# Patient Record
Sex: FEMALE | Race: WHITE | NOT HISPANIC OR LATINO | Employment: OTHER | URBAN - METROPOLITAN AREA
[De-identification: names, ages, dates, MRNs, and addresses within clinical notes are randomized per-mention and may not be internally consistent; named-entity substitution may affect disease eponyms.]

---

## 2017-01-24 ENCOUNTER — GENERIC CONVERSION - ENCOUNTER (OUTPATIENT)
Dept: OTHER | Facility: OTHER | Age: 62
End: 2017-01-24

## 2017-02-15 ENCOUNTER — GENERIC CONVERSION - ENCOUNTER (OUTPATIENT)
Dept: OTHER | Facility: OTHER | Age: 62
End: 2017-02-15

## 2017-03-16 ENCOUNTER — GENERIC CONVERSION - ENCOUNTER (OUTPATIENT)
Dept: OTHER | Facility: OTHER | Age: 62
End: 2017-03-16

## 2017-03-20 DIAGNOSIS — E78.5 HYPERLIPIDEMIA: ICD-10-CM

## 2017-03-20 DIAGNOSIS — Z13.820 ENCOUNTER FOR SCREENING FOR OSTEOPOROSIS: ICD-10-CM

## 2017-03-20 DIAGNOSIS — Z12.31 ENCOUNTER FOR SCREENING MAMMOGRAM FOR MALIGNANT NEOPLASM OF BREAST: ICD-10-CM

## 2017-04-12 ENCOUNTER — GENERIC CONVERSION - ENCOUNTER (OUTPATIENT)
Dept: OTHER | Facility: OTHER | Age: 62
End: 2017-04-12

## 2017-04-15 ENCOUNTER — LAB CONVERSION - ENCOUNTER (OUTPATIENT)
Dept: OTHER | Facility: OTHER | Age: 62
End: 2017-04-15

## 2017-04-15 LAB
CHOLEST SERPL-MCNC: 215 MG/DL (ref 125–200)
CHOLEST/HDLC SERPL: 3 (CALC)
HDLC SERPL-MCNC: 71 MG/DL
LDL CHOLESTEROL (HISTORICAL): 122 MG/DL (CALC)
NON-HDL-CHOL (CHOL-HDL) (HISTORICAL): 144 MG/DL (CALC)
TRIGL SERPL-MCNC: 108 MG/DL

## 2017-04-18 ENCOUNTER — ALLSCRIPTS OFFICE VISIT (OUTPATIENT)
Dept: OTHER | Facility: OTHER | Age: 62
End: 2017-04-18

## 2017-04-24 ENCOUNTER — HOSPITAL ENCOUNTER (OUTPATIENT)
Dept: BONE DENSITY | Facility: MEDICAL CENTER | Age: 62
Discharge: HOME | End: 2017-04-24
Payer: COMMERCIAL

## 2017-04-24 DIAGNOSIS — Z13.820 ENCOUNTER FOR SCREENING FOR OSTEOPOROSIS: ICD-10-CM

## 2017-04-24 PROCEDURE — 77080 DXA BONE DENSITY AXIAL: CPT

## 2017-05-01 ENCOUNTER — GENERIC CONVERSION - ENCOUNTER (OUTPATIENT)
Dept: OTHER | Facility: OTHER | Age: 62
End: 2017-05-01

## 2017-05-10 ENCOUNTER — ALLSCRIPTS OFFICE VISIT (OUTPATIENT)
Dept: OTHER | Facility: OTHER | Age: 62
End: 2017-05-10

## 2017-05-31 ENCOUNTER — LAB CONVERSION - ENCOUNTER (OUTPATIENT)
Dept: OTHER | Facility: OTHER | Age: 62
End: 2017-05-31

## 2017-05-31 LAB
CA 125 (HISTORICAL): 11 U/ML
CA 125 (HISTORICAL): 11 U/ML

## 2017-06-01 ENCOUNTER — GENERIC CONVERSION - ENCOUNTER (OUTPATIENT)
Dept: OTHER | Facility: OTHER | Age: 62
End: 2017-06-01

## 2017-06-13 ENCOUNTER — GENERIC CONVERSION - ENCOUNTER (OUTPATIENT)
Dept: OTHER | Facility: OTHER | Age: 62
End: 2017-06-13

## 2017-08-15 DIAGNOSIS — R09.89 OTHER SPECIFIED SYMPTOMS AND SIGNS INVOLVING THE CIRCULATORY AND RESPIRATORY SYSTEMS: ICD-10-CM

## 2017-08-16 ENCOUNTER — GENERIC CONVERSION - ENCOUNTER (OUTPATIENT)
Dept: OTHER | Facility: OTHER | Age: 62
End: 2017-08-16

## 2017-08-18 ENCOUNTER — GENERIC CONVERSION - ENCOUNTER (OUTPATIENT)
Dept: OTHER | Facility: OTHER | Age: 62
End: 2017-08-18

## 2017-08-25 ENCOUNTER — GENERIC CONVERSION - ENCOUNTER (OUTPATIENT)
Dept: OTHER | Facility: OTHER | Age: 62
End: 2017-08-25

## 2017-09-11 ENCOUNTER — TRANSCRIBE ORDERS (OUTPATIENT)
Dept: ADMINISTRATIVE | Facility: HOSPITAL | Age: 62
End: 2017-09-11

## 2017-09-11 DIAGNOSIS — E78.5 HYPERLIPIDEMIA, UNSPECIFIED HYPERLIPIDEMIA TYPE: Primary | ICD-10-CM

## 2017-09-25 ENCOUNTER — HOSPITAL ENCOUNTER (OUTPATIENT)
Dept: ULTRASOUND IMAGING | Facility: MEDICAL CENTER | Age: 62
Discharge: HOME | End: 2017-09-25
Payer: COMMERCIAL

## 2017-09-25 DIAGNOSIS — E78.5 HYPERLIPIDEMIA, UNSPECIFIED HYPERLIPIDEMIA TYPE: ICD-10-CM

## 2017-09-25 PROCEDURE — 93880 EXTRACRANIAL BILAT STUDY: CPT

## 2017-09-26 ENCOUNTER — GENERIC CONVERSION - ENCOUNTER (OUTPATIENT)
Dept: OTHER | Facility: OTHER | Age: 62
End: 2017-09-26

## 2018-01-10 NOTE — RESULT NOTES
Discussion/Summary   Carotid ultrasound was reviewed,  showed less than 50 percent blockage on both sides, it is similar to the previous study in 2015 with no changes  Verified Results  VAS CAROTID COMPLETE STUDY [id number] 04:12PM Rozashu Reef     Test Name Result Flag Reference   VAS CAROTID COMPLETE STUDY (Report)     [facility] REPORT   CLINICAL:   Indications: Carotid disease w/o CVA [I65 29]  Patient for a follow-up  Asymptomatic  Risk Factors   The patient has history of hyperlipidemia  She has no history of HTN or   Diabetes  Clinical   Right Pressure: 106/72 mm Hg, Left Pressure: 110/70 mm Hg  FINDINGS:      Right    Impression PSV EDV (cm/s) Direction of Flow Ratio    Dist  ICA         55     23           0 65    Mid  ICA         92     32           1 10    Prox  ICA  1 - 49%   66     24           0 79    Dist CCA         71     22                 Mid CCA          84     26           0 98    Prox CCA         85     26                 Ext Carotid        98     16           1 17    Prox Vert         60     19 Antegrade            Subclavian        95      0                    Left     Impression PSV EDV (cm/s) Direction of Flow Ratio    Dist  ICA         81     31           0 92    Mid  ICA         80     35           0 91    Prox  ICA  1 - 49%   64     16           0 72    Dist CCA         84     26                 Mid CCA          88     25           1 12    Prox CCA         79     17                 Ext Carotid       103     18           1 17    Prox Vert         44     15 Antegrade            Subclavian        124      0                          CONCLUSION:      Impression   RIGHT:   There is <50% stenosis noted in the internal carotid artery  Plaque is   heterogenous and smooth  Vertebral artery flow is antegrade  There is no significant subclavian artery   disease  LEFT:   There is <50% stenosis noted in the internal carotid artery  Plaque is   homogenous and smooth  Vertebral artery flow is antegrade  There is no significant subclavian artery   disease  Compared to previous study on 1/5/15 , there is no significant change in the   disease process  Internal carotid artery stenosis determination by consensus criteria from:   Latonia Salguero et al  Carotid Artery Stenosis: Gray-Scale and Doppler US Diagnosis   - Society of [employer]iologists in [address] [place] [address], Radiology 2003;   [phone]        SIGNATURE:   Electronically Signed by: Stephen Stern on 2017-09-25 05:41:10 PM

## 2018-01-10 NOTE — MISCELLANEOUS
Message   Recorded as Task   Date: 05/31/2017 04:32 PM, Created By: Ahmet Witt   Task Name: Result Follow Up   Assigned To: [facility],Team   Regarding Patient: Blas Cantrell, Status: In Progress   NoahMateo Few - 31 May 2017 4:32 PM     TASK CREATED  Normal   Please notify patient     Thanks   Aelx Landeros - 01 Jun 2017 7:26 AM     TASK IN PROGRESS   Kesha Holliday - 01 Jun 2017 7:28 AM     TASK EDITED  LMOM of normal   pt to call back with any questions or problems        Active Problems    1  Abdominal wall bulge (789 30) (R19 00)   2  Abnormal pelvic exam (793 5) (Z01 411)   3  Anxiety disorder (300 00) (F41 9)   4  Atrophy of vagina (627 3) (N95 2)   5  Carotid bruit (785 9) (R09 89)   6  GERD (gastroesophageal reflux disease) (530 81) (K21 9)   7  Hot flashes, menopausal (627 2) (N95 1)   8  Hyperlipidemia (272 4) (E78 5)   9  Insomnia (780 52) (G47 00)   10  Menopausal state (627 2) (N95 1)   11  Right hip pain (719 45) (M25 551)   12  Vaginal wall prolapse (618 00) (N81 10)    Current Meds   1  Atorvastatin Calcium 10 MG Oral Tablet; Take 1 tablet daily; Therapy: 14Wma4866 to (Evaluate:06Nov2017)  Requested for: 26ICK7912; Last   Rx:40Blg6132 Ordered   2  DrRx Flexeril 10 MG #30; 1 TAB TID PRN; Therapy: 71VZA0892 to (Last Rx:10May2017) Ordered   3  Garlic CAPS; TAKE 1 CAPSULE Daily Recorded   4  LORazepam 0 5 MG Oral Tablet; Take 1 tablet once daily; Therapy: 00TDY1440 to (Evaluate:82Uim7107)  Requested for: 15Rfi8968; Last   Rx:80Wvc4579 Ordered   5  Omega 3 1000 MG Oral Capsule; TAKE 1 CAPSULE DAILY; Therapy: (Recorded:26Mar2015) to Recorded   6  Omeprazole 20 MG Oral Capsule Delayed Release; Therapy: 76UOX9417 to Recorded   7  Zolpidem Tartrate 10 MG Oral Tablet; TAKE 1 TABLET NIGHTLY; Therapy: 76Ayz7681 to (Evaluate:24Apr2017)  Requested for: 67DIR3219; Last   Rx:25Jan2017 Ordered    Allergies    1   No Known Drug Allergies    Signatures   Electronically signed by : Breonna Calvert, ; Jun 1 2017  7:29AM EST                       (Author)

## 2018-01-11 NOTE — MISCELLANEOUS
Message   Recorded as Task   Date: 08/14/2017 12:14 PM, Created By: Lev Gandhi   Task Name: Follow Up   Assigned To: [facility] Team   Regarding Patient: KASHMIR CAMPOVERDE, Status: In Progress   Chinmay De Oliveira - 14 Aug 2017 12:14 PM     TASK CREATED  Caller: Self; General Medical Question; [phone] (Home); [phone] (Work)  Patient wants to have a routine carotid U/S and stress test script written up for her by Dr Lissa Stallworth  She states that she gets one done usually every 3-4 years  She doesn't see a cardiologist or anything either  Please advise  Naheed Garcia - 14 Aug 2017 4:33 PM     TASK REASSIGNED: Previously Assigned To [facility] [place] - 15 Aug 2017 10:08 AM     TASK EDITED  I did review pt chart   she had stress test and carotid US in 2013 that was ordered by Janel Montgomery     Carotid US will be ordered again since pt had h/o carotid bruit and we need to fu on that from time to time     Stress test was ordered back then because pt had sx of chest pain, no need for repeat stress test unless pt has cardiac symptoms  order for carotid US is in  Teri Li - 16 Aug 2017 11:39 AM     TASK IN PROGRESS   Teri Li - 16 Aug 2017 11:47 AM     TASK EDITED  pt notified of DM response  order for carotid ultrasound mailed to pt as requested  Active Problems    1  Abdominal wall bulge (789 30) (R19 00)   2  Abnormal pelvic exam (793 5) (Z01 411)   3  Anxiety disorder (300 00) (F41 9)   4  Atrophy of vagina (627 3) (N95 2)   5  Carotid bruit (785 9) (R09 89)   6  GERD (gastroesophageal reflux disease) (530 81) (K21 9)   7  Hot flashes, menopausal (627 2) (N95 1)   8  Hyperlipidemia (272 4) (E78 5)   9  Insomnia (780 52) (G47 00)   10  Menopausal state (627 2) (N95 1)   11  Right hip pain (719 45) (M25 551)   12  Vaginal wall prolapse (618 00) (N81 10)    Current Meds   1  Atorvastatin Calcium 10 MG Oral Tablet;  Take 1 tablet daily; Therapy: 64Cop4274 to (Evaluate:06Nov2017)  Requested for: 33VHC9346; Last   Rx:10May2017 Ordered   2  DrRx Flexeril 10 MG #30; 1 TAB TID PRN; Therapy: 68GHU2926 to (Last Rx:10May2017) Ordered   3  Garlic CAPS; TAKE 1 CAPSULE Daily Recorded   4  LORazepam 0 5 MG Oral Tablet; Take 1 tablet once daily; Therapy: 75MAW0310 to (Cici Cain)  Requested for: 69NUO7031; Last   Rx:22Jun2017 Ordered   5  Omega 3 1000 MG Oral Capsule; TAKE 1 CAPSULE DAILY; Therapy: (Recorded:26Mar2015) to Recorded   6  Omeprazole 20 MG Oral Capsule Delayed Release; Therapy: 26DGK6046 to Recorded   7  Zolpidem Tartrate 10 MG Oral Tablet; TAKE 1 TABLET NIGHTLY; Therapy: 63Zfr4485 to (Evaluate:24Apr2017)  Requested for: 29GLK7220; Last   Rx:25Jan2017 Ordered    Allergies    1   No Known Drug Allergies    Signatures   Electronically signed by : Lalito Duran, ; Aug 16 2017 11:48AM EST                       (Author)

## 2018-01-11 NOTE — MISCELLANEOUS
Message   Recorded as Task   Date: 01/23/2017 10:03 AM, Created By: Anel Joshi   Task Name: Med Renewal Request   Assigned To: Hannah Renee   Regarding Patient: Karishma Johns, Status: In Progress   Comment:    WilSvitlana - 23 Jan 2017 10:03 AM     TASK CREATED  Caller: Self; Renew Medication; [phone] (Home)  Pt called to request refill of Zolpidem called in to [facility]  Pt requested a call once the rx is sent to them  Pt is @ [phone] ok to LM per pt   Phong Chu - 23 Jan 2017 10:39 AM     TASK REASSIGNED: Previously Assigned To Sunday Rubio - 23 Jan 2017 10:48 AM     TASK EDITED  Pt uses ambien 10 mg, has been getting 45 for 3 mo and 1 refill  [facility]  may I refill it? Thanks   Omar Conway - 24 Jan 2017 2:32 PM     TASK REPLIED TO: Previously Assigned To Omar Conway  You certainly may refill Ambien 10 mg mg once daily x 90 days  tx  JC   Lana Fisher - 24 Jan 2017 2:48 PM     TASK IN PROGRESS   Lana Fisher - 24 Jan 2017 2:49 PM     TASK EDITED  rx for ambien 10mg [facility] at [phone]   Bravo Lulu - 24 Jan 2017 2:52 PM     TASK EDITED  lm for pt that rx faxed        Active Problems    1  Abnormal tympanic membrane (384 9) (H73 90)   2  Acid reflux disease (530 81) (K21 9)   3  Anxiety disorder (300 00) (F41 9)   4  Atrophy of vagina (627 3) (N95 2)   5  Carotid bruit (785 9) (R09 89)   6  Dysphagia (787 20) (R13 10)   7  Earache, right (388 70) (H92 01)   8  Hot flashes, menopausal (627 2) (N95 1)   9  Hyperlipidemia (272 4) (E78 5)   10  Insomnia (780 52) (G47 00)   11  Menopausal state (627 2) (N95 1)   12  Screening for ovarian cancer (V76 46) (Z12 73)   13  Sinusitis (473 9) (J32 9)   14  Vaginal wall prolapse (618 00) (N81 10)    Current Meds   1  Atorvastatin Calcium 10 MG Oral Tablet; Take 1 tablet daily; Therapy: 31Yzx2834 to (Evaluate:98Mga7742)  Requested for: 64FYF0017; Last   Rx:05Nlq9150 Ordered   2  Garlic CAPS; TAKE 1 CAPSULE Daily Recorded   3  LORazepam 0 5 MG Oral Tablet; Take 1 tablet once daily; Therapy: 58AOR9175 to (Helen Garcia)  Requested for: 96ETM6875; Last   Rx:46Yab8410 Ordered   4  Omega 3 1000 MG Oral Capsule; TAKE 1 CAPSULE DAILY; Therapy: (Recorded:26Mar2015) to Recorded   5  Sulfamethoxazole-Trimethoprim 800-160 MG Oral Tablet; Take 1 tablet twice daily; Therapy: 69JPC6327 to (Last Rx:87Mgk5178)  Requested for: 06LCJ5660 Ordered   6  Zolpidem Tartrate 10 MG Oral Tablet; TABS PO X 60; Therapy: 60Tvt4280 to (Evaluate:25Hel0815)  Requested for: 32Plf9658 Recorded    Allergies    1   No Known Drug Allergies    Plan  Anxiety disorder    · From  Zolpidem Tartrate 10 MG Oral Tablet TABS PO X 60 To Zolpidem  Tartrate 10 MG Oral Tablet TAKE 1 TABLET NIGHTLY    Signatures   Electronically signed by : Raya Paulson, ; Jan 24 2017  2:52PM EST                       (Author)

## 2018-01-12 NOTE — MISCELLANEOUS
Message   Recorded as Task   Date: 01/23/2017 10:03 AM, Created By: Darcy Mayorga   Task Name: Med Renewal Request   Assigned To: Heather Corey   Regarding Patient: Myesha Ontiveros, Status: In Progress   Comment:    Svitlana De La Torre - 23 Jan 2017 10:03 AM     TASK CREATED  Caller: Self; Renew Medication; [phone] (Home)  Pt called to request refill of Zolpidem called in to [facility]  Pt requested a call once the rx is sent to them  Pt is @ [phone] ok to LM per pt   John Paul Solomon - 23 Jan 2017 10:39 AM     TASK REASSIGNED: Previously Assigned To Laureano Ibrahim - 23 Jan 2017 10:48 AM     TASK EDITED  Pt uses ambien 10 mg, has been getting 45 for 3 mo and 1 refill  [facility]  may I refill it? Thanks   Omar Conway - 24 Jan 2017 2:32 PM     TASK REPLIED TO: Previously Assigned To Omar Conway  You certainly may refill Ambien 10 mg mg once daily x 90 days  tx  [facility]   Lana Fisher - 24 Jan 2017 2:48 PM     TASK IN PROGRESS   Lana Fisher - 24 Jan 2017 2:49 PM     TASK EDITED  rx [facility] ambien [facility]mg [facility] [phone]        Active Problems    1  Abnormal tympanic membrane (384 9) (H73 90)   2  Acid reflux disease (530 81) (K21 9)   3  Anxiety disorder (300 00) (F41 9)   4  Atrophy of vagina (627 3) (N95 2)   5  Carotid bruit (785 9) (R09 89)   6  Dysphagia (787 20) (R13 10)   7  Earache, right (388 70) (H92 01)   8  Hot flashes, menopausal (627 2) (N95 1)   9  Hyperlipidemia (272 4) (E78 5)   10  Insomnia (780 52) (G47 00)   11  Menopausal state (627 2) (N95 1)   12  Screening for ovarian cancer (V76 46) (Z12 73)   13  Sinusitis (473 9) (J32 9)   14  Vaginal wall prolapse (618 00) (N81 10)    Current Meds   1  Atorvastatin Calcium 10 MG Oral Tablet; Take 1 tablet daily; Therapy: 57Uvq3849 to (Evaluate:19Rcn1757)  Requested for: 80SAW3779; Last   Rx:60Ojy0221 Ordered   2  Garlic CAPS; TAKE 1 CAPSULE Daily Recorded   3   LORazepam 0 5 MG Oral Tablet; Take 1 tablet once daily; Therapy: 96PGP1447 to (Tio Collann-marie)  Requested for: 53MIH3681; Last   Rx:35Eux7868 Ordered   4  Omega 3 1000 MG Oral Capsule; TAKE 1 CAPSULE DAILY; Therapy: (Recorded:26Mar2015) to Recorded   5  Sulfamethoxazole-Trimethoprim 800-160 MG Oral Tablet; Take 1 tablet twice daily; Therapy: 20XIU0640 to (Last Rx:00Fpr5338)  Requested for: 22AXW7696 Ordered   6  Zolpidem Tartrate 10 MG Oral Tablet; TABS PO X 60; Therapy: 24Sds0513 to (Evaluate:88Rsu4989)  Requested for: 76Nfi4644 Recorded    Allergies    1   No Known Drug Allergies    Plan  Anxiety disorder    · From  Zolpidem Tartrate 10 MG Oral Tablet TABS PO X 60 To Zolpidem  Tartrate 10 MG Oral Tablet TAKE 1 TABLET NIGHTLY    Signatures   Electronically signed by : Clare Chaparro, ; Jan 24 2017  2:50PM EST                       (Author)

## 2018-01-13 NOTE — RESULT NOTES
Message   blood work looks great    her cholesterol numbers improved in comapre to the last time to cont with her current regimen and lipitor  Verified Results  (1) LIPID PANEL, FASTING 01Sep2016 10:01AM Balta See     Test Name Result Flag Reference   CHOLESTEROL, TOTAL 212 mg/dL H 125-200   HDL CHOLESTEROL 74 mg/dL  > OR = 46   TRIGLICERIDES 413 mg/dL  <150   LDL-CHOLESTEROL 116 mg/dL (calc)  <130   Desirable range <100 mg/dL for patients with CHD or  diabetes and <70 mg/dL for diabetic patients with  known heart disease  CHOL/HDLC RATIO 2 9 (calc)  < OR = 5 0   NON HDL CHOLESTEROL 138 mg/dL (calc)     Target for non-HDL cholesterol is 30 mg/dL higher than   LDL cholesterol target

## 2018-01-13 NOTE — MISCELLANEOUS
Message   Recorded as Task   Date: 12/16/2016 02:05 PM, Created By: iKesha Lomas   Task Name: Follow Up   Assigned To: Lennox Sarah   Regarding Patient: Sunday Herrera, Status: In Progress   Comment:    Darcy Russ - 16 Dec 2016 2:05 PM     TASK CREATED  Caller: Self; [phone] (Mobile Phone)  pt called in reguards to u/s results please advise pt @ [phone]   Lana Fisher - 16 Dec 2016 2:26 PM     TASK IN PROGRESS   Lana Fisher - 16 Dec 2016 2:28 PM     TASK EDITED  lm for pt that  u/s wnl   Lana Fisher - 16 Dec 2016 3:30 PM     TASK EDITED  returned pts call, lm for pt tcb Socorro Zurita - 01 Dec 2016 8:15 AM     TASK EDITED  Pt was concerned about vag prolapse  She will wait condition out        Active Problems    1  Abnormal tympanic membrane (384 9) (H73 90)   2  Acid reflux disease (530 81) (K21 9)   3  Anxiety disorder (300 00) (F41 9)   4  Atrophy of vagina (627 3) (N95 2)   5  Carotid bruit (785 9) (R09 89)   6  Dysphagia (787 20) (R13 10)   7  Earache, right (388 70) (H92 01)   8  Hot flashes, menopausal (627 2) (N95 1)   9  Hyperlipidemia (272 4) (E78 5)   10  Insomnia (780 52) (G47 00)   11  Menopausal state (627 2) (N95 1)   12  Screening for ovarian cancer (V76 46) (Z12 73)   13  Sinusitis (473 9) (J32 9)   14  Vaginal wall prolapse (618 00) (N81 10)    Current Meds   1  Atorvastatin Calcium 10 MG Oral Tablet; Take 1 tablet daily; Therapy: 51Kev2135 to (Evaluate:02Rte8977)  Requested for: 53YWT9931; Last   Rx:22Cyr1632 Ordered   2  Garlic CAPS; TAKE 1 CAPSULE Daily Recorded   3  LORazepam 0 5 MG Oral Tablet; Take 1 tablet once daily; Therapy: 87MCD4311 to (Lajoyce Marine)  Requested for: 43UXY6852; Last   Rx:99Grk2350 Ordered   4  Omega 3 1000 MG Oral Capsule; TAKE 1 CAPSULE DAILY; Therapy: (Recorded:26Mar2015) to Recorded   5  Sulfamethoxazole-Trimethoprim 800-160 MG Oral Tablet; Take 1 tablet twice daily;    Therapy: 54QAS8979 to (Last Rx:95Pyc3690) Requested for: 34RRH3805 Ordered   6  Zolpidem Tartrate 10 MG Oral Tablet; TABS PO X 60; Therapy: 23Bpr9048 to (Evaluate:67Cvd9666)  Requested for: 98Map7781 Recorded    Allergies    1  No Known Drug Allergies    Signatures   Electronically signed by :  January Chavez, ; Dec 19 2016  8:15AM EST                       (Author)

## 2018-01-14 VITALS
SYSTOLIC BLOOD PRESSURE: 118 MMHG | HEART RATE: 64 BPM | DIASTOLIC BLOOD PRESSURE: 70 MMHG | WEIGHT: 157 LBS | HEIGHT: 67 IN | BODY MASS INDEX: 24.64 KG/M2

## 2018-01-14 VITALS
DIASTOLIC BLOOD PRESSURE: 74 MMHG | SYSTOLIC BLOOD PRESSURE: 118 MMHG | BODY MASS INDEX: 20.86 KG/M2 | WEIGHT: 154 LBS | HEIGHT: 72 IN

## 2018-01-16 NOTE — MISCELLANEOUS
Message   Recorded as Task   Date: 04/30/2017 07:23 AM, Created By: Rafael Bond   Task Name: Care Coordination   Assigned To: Lennox Sarah   Regarding Patient: Sunday Herrera, Status: In Progress   Comment:    Omar Conway - 30 Apr 2017 7:23 AM     TASK CREATED  Pt should be aware that her BMD is normal to better than normal!  Dietary calcium and multiple vitamin is still recommended  Becki Rider - 01 May 2017 7:53 AM     TASK IN PROGRESS   Becki Rider - 01 May 2017 7:57 AM     TASK EDITED  LMOM to cb    Becki Rider - 01 May 2017 8:32 AM     TASK EDITED  Pt returned call - advised her of DEXA results and M87 recommendations  Active Problems    1  Abnormal pelvic exam (793 5) (Z01 411)   2  Abnormal tympanic membrane (384 9) (H73 90)   3  Acid reflux disease (530 81) (K21 9)   4  Anxiety disorder (300 00) (F41 9)   5  Atrophy of vagina (627 3) (N95 2)   6  Carotid bruit (785 9) (R09 89)   7  Dysphagia (787 20) (R13 10)   8  Earache, right (388 70) (H92 01)   9  Encounter for screening mammogram for malignant neoplasm of breast (V76 12)   (Z12 31)   10  Hot flashes, menopausal (627 2) (N95 1)   11  Hyperlipidemia (272 4) (E78 5)   12  Insomnia (780 52) (G47 00)   13  Menopausal state (627 2) (N95 1)   14  Osteoporosis screening (V82 81) (Z13 820)   15  Screening for ovarian cancer (V76 46) (Z12 73)   16  Sinusitis (473 9) (J32 9)   17  Vaginal wall prolapse (618 00) (N81 10)    Current Meds   1  Atorvastatin Calcium 10 MG Oral Tablet; Take 1 tablet daily; Therapy: 52Csv0688 to (Evaluate:72Ztb6708)  Requested for: 96DMY3399; Last   Rx:66Kif3597 Ordered   2  Garlic CAPS; TAKE 1 CAPSULE Daily Recorded   3  LORazepam 0 5 MG Oral Tablet; Take 1 tablet once daily; Therapy: 94XEY6960 to (Evaluate:54Low2934)  Requested for: 17Egp2889; Last   Rx:64Xxi5197 Ordered   4  Omega 3 1000 MG Oral Capsule; TAKE 1 CAPSULE DAILY; Therapy: (Recorded:26Mar2015) to Recorded   5   Zolpidem Tartrate 10 MG Oral Tablet; TAKE 1 TABLET NIGHTLY; Therapy: 99Lyg3828 to (Evaluate:24Apr2017)  Requested for: 36GLM7864; Last   Rx:25Jan2017 Ordered    Allergies    1   No Known Drug Allergies    Signatures   Electronically signed by : oZhreh Sigala, ; May  1 2017  8:33AM EST                       (Author)

## 2018-01-17 NOTE — MISCELLANEOUS
Message   Recorded as Task   Date: 08/18/2017 11:03 AM, Created By: Kali Ott   Task Name: Med Renewal Request   Assigned To: Eamon Zarate   Regarding Patient: Patricia Cadet, Status: In Progress   Comment:    Ruby Botello - 18 Aug 2017 11:03 AM     TASK CREATED  Pt called office today, left voicemail message  Pt requests Rx refill of Zolpidem Tartrate 10 mg tablet as prescribed by Dr Ayleen Bowles on 1/25/17  Pt saw Dr Ayleen Bowles on 4/18/17, is scheduled to see Donita Coats on 8/25/17  Pt requests nursing staff return her call @ [phone]  Thank you! Becki Rider - 18 Aug 2017 11:07 AM     TASK IN PROGRESS   Becki Rider - 18 Aug 2017 11:08 AM     TASK EDITED  Ok to refill? Please advise  Thanks! Omar Conway - 18 Aug 2017 12:23 PM     TASK EDITED  Naya Ege to refill  as written  Junita Aland - 21 Aug 2017 12:23 PM     TASK EDITED  OK to refill as written  Evaline  - 18 Aug 2017 1:05 PM     TASK EDITED   Carla Stalling - 18 Aug 2017 1:08 PM     TASK IN PROGRESS   Lorene Low - 18 Aug 2017 1:25 PM     TASK EDITED  RX was ordered and sent to Dr Ayleen Bowles for approval   Patient was notified rx to be sent to her mailorder per her request         Active Problems    1  Abdominal wall bulge (789 30) (R19 00)   2  Abnormal pelvic exam (793 5) (Z01 411)   3  Anxiety disorder (300 00) (F41 9)   4  Atrophy of vagina (627 3) (N95 2)   5  Carotid bruit (785 9) (R09 89)   6  GERD (gastroesophageal reflux disease) (530 81) (K21 9)   7  Hot flashes, menopausal (627 2) (N95 1)   8  Hyperlipidemia (272 4) (E78 5)   9  Insomnia (780 52) (G47 00)   10  Menopausal state (627 2) (N95 1)   11  Right hip pain (719 45) (M25 551)   12  Vaginal wall prolapse (618 00) (N81 10)    Current Meds   1  Atorvastatin Calcium 10 MG Oral Tablet; Take 1 tablet daily; Therapy: 79Zmb2044 to (Evaluate:06Nov2017)  Requested for: 27EPC7251; Last   Rx:47Cxp1330 Ordered   2   DrRx Flexeril 10 MG #30; 1 TAB TID PRN; Therapy: 95LBT9480 to (Last Rx:56Sxt4350) Ordered   3  Garlic CAPS; TAKE 1 CAPSULE Daily Recorded   4  LORazepam 0 5 MG Oral Tablet; Take 1 tablet once daily; Therapy: 64TLB4374 to (Bright Feldman)  Requested for: 11FCC7881; Last   Rx:22Jun2017 Ordered   5  Omega 3 1000 MG Oral Capsule; TAKE 1 CAPSULE DAILY; Therapy: (Recorded:26Mar2015) to Recorded   6  Omeprazole 20 MG Oral Capsule Delayed Release; Therapy: 02ENB7244 to Recorded   7  Zolpidem Tartrate 10 MG Oral Tablet; TAKE 1 TABLET NIGHTLY; Therapy: 70Mlw0754 to (Evaluate:24Apr2017)  Requested for: 04AIV9452; Last   Rx:25Jan2017 Ordered    Allergies    1   No Known Drug Allergies    Plan  Anxiety disorder    · Zolpidem Tartrate 10 MG Oral Tablet; TAKE 1 TABLET NIGHTLY    Signatures   Electronically signed by : Marita Paulino, ; Aug 18 2017  1:25PM EST                       (Author)

## 2018-01-17 NOTE — MISCELLANEOUS
Message   Recorded as Task   Date: 12/16/2016 02:05 PM, Created By: Tommy Chua   Task Name: Follow Up   Assigned To: David Calvillo   Regarding Patient: More Portal, Status: In Progress   Comment:    Darcy Russ - 16 Dec 2016 2:05 PM     TASK CREATED  Caller: Self; [phone] (Mobile Phone)  pt called in [facility] to u/s results please advise pt @ [phone]   Lana Fisher - 16 Dec 2016 2:26 PM     TASK IN PROGRESS   Lana Fisher - 16 Dec 2016 2:28 PM     TASK EDITED  lm for pt that  u/s wnl        Active Problems    1  Abnormal tympanic membrane (384 9) (H73 90)   2  Acid reflux disease (530 81) (K21 9)   3  Anxiety disorder (300 00) (F41 9)   4  Atrophy of vagina (627 3) (N95 2)   5  Carotid bruit (785 9) (R09 89)   6  Dysphagia (787 20) (R13 10)   7  Earache, right (388 70) (H92 01)   8  Hot flashes, menopausal (627 2) (N95 1)   9  Hyperlipidemia (272 4) (E78 5)   10  Insomnia (780 52) (G47 00)   11  Menopausal state (627 2) (N95 1)   12  Screening for ovarian cancer (V76 46) (Z12 73)   13  Sinusitis (473 9) (J32 9)   14  Vaginal wall prolapse (618 00) (N81 10)    Current Meds   1  Atorvastatin Calcium 10 MG Oral Tablet; Take 1 tablet daily; Therapy: 19Lzj0418 to (Evaluate:31Swj9271)  Requested for: 86OGI0494; Last   Rx:83Qlq7099 Ordered   2  Garlic CAPS; TAKE 1 CAPSULE Daily Recorded   3  LORazepam 0 5 MG Oral Tablet; Take 1 tablet once daily; Therapy: 46PUY8964 to (Baldo Payan)  Requested for: 78CXE7232; Last   Rx:73Ajk9930 Ordered   4  Omega 3 1000 MG Oral Capsule; TAKE 1 CAPSULE DAILY; Therapy: (Recorded:26Mar2015) to Recorded   5  Sulfamethoxazole-Trimethoprim 800-160 MG Oral Tablet; Take 1 tablet twice daily; Therapy: 91DNE3530 to (Last Rx:49Itu4925)  Requested for: 97VOG8546 Ordered   6  Zolpidem Tartrate 10 MG Oral Tablet; TABS PO X 60; Therapy: 41Vfo0505 to (Evaluate:35Jki9004)  Requested for: 24Ewv2384 Recorded    Allergies    1   No Known Drug Allergies    Signatures   Electronically signed by : Aidan Hilliard, ; Dec 16 2016  2:28PM EST                       (Author)

## 2018-01-22 VITALS
DIASTOLIC BLOOD PRESSURE: 82 MMHG | HEIGHT: 67 IN | SYSTOLIC BLOOD PRESSURE: 126 MMHG | WEIGHT: 156 LBS | BODY MASS INDEX: 24.48 KG/M2

## 2018-01-31 ENCOUNTER — OFFICE VISIT (OUTPATIENT)
Dept: FAMILY MEDICINE CLINIC | Facility: CLINIC | Age: 63
End: 2018-01-31
Payer: COMMERCIAL

## 2018-01-31 VITALS
RESPIRATION RATE: 16 BRPM | BODY MASS INDEX: 23.76 KG/M2 | HEIGHT: 68 IN | WEIGHT: 156.8 LBS | SYSTOLIC BLOOD PRESSURE: 130 MMHG | HEART RATE: 80 BPM | DIASTOLIC BLOOD PRESSURE: 68 MMHG

## 2018-01-31 DIAGNOSIS — H69.83 DYSFUNCTION OF BOTH EUSTACHIAN TUBES: ICD-10-CM

## 2018-01-31 DIAGNOSIS — M79.641 RIGHT HAND PAIN: ICD-10-CM

## 2018-01-31 DIAGNOSIS — K21.9 GASTROESOPHAGEAL REFLUX DISEASE WITHOUT ESOPHAGITIS: Primary | ICD-10-CM

## 2018-01-31 DIAGNOSIS — E78.2 MIXED HYPERLIPIDEMIA: ICD-10-CM

## 2018-01-31 DIAGNOSIS — F41.1 GENERALIZED ANXIETY DISORDER: ICD-10-CM

## 2018-01-31 DIAGNOSIS — G47.00 INSOMNIA, UNSPECIFIED TYPE: ICD-10-CM

## 2018-01-31 DIAGNOSIS — R09.89 CAROTID BRUIT, UNSPECIFIED LATERALITY: ICD-10-CM

## 2018-01-31 PROCEDURE — 99214 OFFICE O/P EST MOD 30 MIN: CPT | Performed by: FAMILY MEDICINE

## 2018-01-31 PROCEDURE — 1101F PT FALLS ASSESS-DOCD LE1/YR: CPT | Performed by: FAMILY MEDICINE

## 2018-01-31 RX ORDER — ATORVASTATIN CALCIUM 10 MG/1
10 TABLET, FILM COATED ORAL DAILY
Qty: 90 TABLET | Refills: 3 | Status: SHIPPED | OUTPATIENT
Start: 2018-01-31 | End: 2018-05-01 | Stop reason: SDUPTHER

## 2018-01-31 RX ORDER — CHLORAL HYDRATE 500 MG
1 CAPSULE ORAL DAILY
COMMUNITY

## 2018-01-31 RX ORDER — ZOLPIDEM TARTRATE 10 MG/1
10 TABLET ORAL DAILY
COMMUNITY
Start: 2010-12-27 | End: 2018-05-03 | Stop reason: SDUPTHER

## 2018-01-31 RX ORDER — NICOTINE POLACRILEX 4 MG/1
GUM, CHEWING ORAL DAILY
COMMUNITY
Start: 2017-05-10

## 2018-01-31 RX ORDER — LORAZEPAM 0.5 MG/1
1 TABLET ORAL DAILY
COMMUNITY
Start: 2011-07-12 | End: 2018-07-24 | Stop reason: SDUPTHER

## 2018-01-31 RX ORDER — ATORVASTATIN CALCIUM 10 MG/1
1 TABLET, FILM COATED ORAL DAILY
COMMUNITY
Start: 2015-04-14 | End: 2018-01-31 | Stop reason: SDUPTHER

## 2018-01-31 RX ORDER — CYCLOBENZAPRINE HCL 10 MG
1 TABLET ORAL 3 TIMES DAILY PRN
COMMUNITY
Start: 2017-05-10 | End: 2018-05-03 | Stop reason: ALTCHOICE

## 2018-01-31 NOTE — PROGRESS NOTES
Assessment/Plan:       Diagnoses and all orders for this visit:    Gastroesophageal reflux disease without esophagitis  Comments:  Managed by GI, had upper endoscopy June 2017  Scheduled for repeat endoscopy June 2018, continue with the PPI  Generalized anxiety disorder  Comments:  Controlled continue with lorazepam as needed  Orders:  -     CBC and differential  -     Lipid panel  -     TSH, 3rd generation with T4 reflex  -     Vitamin D 25 hydroxy    Carotid bruit, unspecified laterality  Comments:  Carotid ultrasound 9/2017 showed less than 50 percent carotid plaque  Mixed hyperlipidemia  Comments:  Patient taking statin, follow up on lipid panel  Orders:  -     Comprehensive metabolic panel  -     Lipid panel  -     TSH, 3rd generation with T4 reflex  -     atorvastatin (LIPITOR) 10 mg tablet; Take 1 tablet (10 mg total) by mouth daily    Insomnia, unspecified type  Comments:  Stable    Dysfunction of both eustachian tubes  Comments:  Trial of Flonase, if nasal symptoms to consider second-generation antihistamine  Right hand pain  Comments: At the snuffbox area, most likely from the repetitive movement  Orders:  -     Ambulatory referral to Physical Therapy    Other orders  -     zolpidem (AMBIEN) 10 mg tablet; Take 10 mg by mouth daily  -     Omeprazole 20 MG TBEC; Take by mouth daily  -     LORazepam (ATIVAN) 0 5 mg tablet; Take 1 tablet by mouth daily  -     Omega-3 1000 MG CAPS; Take 1 capsule by mouth daily  -     cyclobenzaprine (FLEXERIL) 10 mg tablet; 1 tablet 3 (three) times a day as needed  -     Discontinue: atorvastatin (LIPITOR) 10 mg tablet; Take 1 tablet by mouth daily  -     Garlic 10 MG CAPS; Take 1 capsule by mouth daily          Subjective:      Patient ID: Yolette Mittal is a 57 y o  female      C/o- follow up [address]    Patient is here for follow-up on her chronic condition include hyperlipidemia, chronic Gerd, anxiety disorder, patient is doing well overall denied any acute complaint  The following portions of the patient's history were reviewed and updated as appropriate: allergies, current medications, past family history, past medical history, past social history, past surgical history and problem list     Review of Systems   Constitutional: Negative for appetite change, chills and fever  HENT: Negative for congestion, sore throat and voice change  Eyes: Negative for pain and visual disturbance  Respiratory: Negative for cough  Cardiovascular: Negative for chest pain and palpitations  Gastrointestinal: Negative for abdominal pain, constipation, diarrhea and nausea  Endocrine: Negative for cold intolerance, heat intolerance and polyuria  Genitourinary: Negative for dysuria, enuresis, flank pain and frequency  Musculoskeletal: Negative for gait problem, joint swelling and neck pain  Skin: Negative for color change and wound  Neurological: Negative for dizziness, syncope, speech difficulty, numbness and headaches  Psychiatric/Behavioral: Negative for behavioral problems and confusion  The patient is not nervous/anxious  Objective:     Physical Exam   Constitutional: She is oriented to person, place, and time  She appears well-developed and well-nourished  HENT:   Head: Normocephalic and atraumatic  Eyes: Conjunctivae and EOM are normal  Pupils are equal, round, and reactive to light  Neck: Normal range of motion  Neck supple  Cardiovascular: Normal rate, regular rhythm, normal heart sounds and intact distal pulses  Pulmonary/Chest: Effort normal and breath sounds normal    Abdominal: Soft  Bowel sounds are normal    Musculoskeletal: Normal range of motion  Neurological: She is alert and oriented to person, place, and time  She has normal reflexes  Skin: Skin is warm and dry  Psychiatric: She has a normal mood and affect  Her behavior is normal    Vitals reviewed

## 2018-01-31 NOTE — PATIENT INSTRUCTIONS
Diagnoses and all orders for this visit:    Gastroesophageal reflux disease without esophagitis  Comments:  Managed by GI, had upper endoscopy June 2017  Scheduled for repeat endoscopy June 2018, continue with the PPI  Generalized anxiety disorder  Comments:  Controlled continue with lorazepam as needed  Orders:  -     CBC and differential  -     Lipid panel  -     TSH, 3rd generation with T4 reflex  -     Vitamin D 25 hydroxy    Carotid bruit, unspecified laterality  Comments:  Carotid ultrasound 9/2017 showed less than 50 percent carotid plaque  Mixed hyperlipidemia  Comments:  Patient taking statin, follow up on lipid panel  Orders:  -     Comprehensive metabolic panel  -     Lipid panel  -     TSH, 3rd generation with T4 reflex  -     atorvastatin (LIPITOR) 10 mg tablet; Take 1 tablet (10 mg total) by mouth daily    Insomnia, unspecified type  Comments:  Stable    Dysfunction of both eustachian tubes  Comments:  Trial of Flonase, if nasal symptoms to consider second-generation antihistamine  Right hand pain  Comments: At the snuffbox area, most likely from the repetitive movement  Orders:  -     Ambulatory referral to Physical Therapy    Other orders  -     zolpidem (AMBIEN) 10 mg tablet; Take 10 mg by mouth daily  -     Omeprazole 20 MG TBEC; Take by mouth daily  -     LORazepam (ATIVAN) 0 5 mg tablet; Take 1 tablet by mouth daily  -     Omega-3 1000 MG CAPS; Take 1 capsule by mouth daily  -     cyclobenzaprine (FLEXERIL) 10 mg tablet; 1 tablet 3 (three) times a day as needed  -     Discontinue: atorvastatin (LIPITOR) 10 mg tablet; Take 1 tablet by mouth daily  -     Garlic 10 MG CAPS;  Take 1 capsule by mouth daily

## 2018-02-02 LAB
25(OH)D3 SERPL-MCNC: 28 NG/ML (ref 30–100)
ALBUMIN SERPL-MCNC: 4.3 G/DL (ref 3.6–5.1)
ALBUMIN/GLOB SERPL: 1.7 (CALC) (ref 1–2.5)
ALP SERPL-CCNC: 71 U/L (ref 33–130)
ALT SERPL-CCNC: 17 U/L (ref 6–29)
AST SERPL-CCNC: 19 U/L (ref 10–35)
BASOPHILS # BLD AUTO: 22 CELLS/UL (ref 0–200)
BASOPHILS NFR BLD AUTO: 0.5 %
BILIRUB SERPL-MCNC: 1 MG/DL (ref 0.2–1.2)
BUN SERPL-MCNC: 17 MG/DL (ref 7–25)
BUN/CREAT SERPL: ABNORMAL (CALC) (ref 6–22)
CALCIUM SERPL-MCNC: 9.4 MG/DL (ref 8.6–10.4)
CHLORIDE SERPL-SCNC: 102 MMOL/L (ref 98–110)
CHOLEST SERPL-MCNC: 229 MG/DL
CHOLEST/HDLC SERPL: 3 (CALC)
CO2 SERPL-SCNC: 28 MMOL/L (ref 20–31)
CREAT SERPL-MCNC: 0.88 MG/DL (ref 0.5–0.99)
EOSINOPHIL # BLD AUTO: 110 CELLS/UL (ref 15–500)
EOSINOPHIL NFR BLD AUTO: 2.5 %
ERYTHROCYTE [DISTWIDTH] IN BLOOD BY AUTOMATED COUNT: 12.4 % (ref 11–15)
GLOBULIN SER CALC-MCNC: 2.5 G/DL (CALC) (ref 1.9–3.7)
GLUCOSE SERPL-MCNC: 102 MG/DL (ref 65–99)
HCT VFR BLD AUTO: 42.3 % (ref 35–45)
HDLC SERPL-MCNC: 76 MG/DL
HGB BLD-MCNC: 14.6 G/DL (ref 11.7–15.5)
LDLC SERPL CALC-MCNC: 132 MG/DL (CALC)
LYMPHOCYTES # BLD AUTO: 1615 CELLS/UL (ref 850–3900)
LYMPHOCYTES NFR BLD AUTO: 36.7 %
MCH RBC QN AUTO: 31.5 PG (ref 27–33)
MCHC RBC AUTO-ENTMCNC: 34.5 G/DL (ref 32–36)
MCV RBC AUTO: 91.2 FL (ref 80–100)
MONOCYTES # BLD AUTO: 361 CELLS/UL (ref 200–950)
MONOCYTES NFR BLD AUTO: 8.2 %
NEUTROPHILS # BLD AUTO: 2292 CELLS/UL (ref 1500–7800)
NEUTROPHILS NFR BLD AUTO: 52.1 %
NONHDLC SERPL-MCNC: 153 MG/DL (CALC)
PLATELET # BLD AUTO: 218 THOUSAND/UL (ref 140–400)
PMV BLD REES-ECKER: 9.7 FL (ref 7.5–12.5)
POTASSIUM SERPL-SCNC: 4.4 MMOL/L (ref 3.5–5.3)
PROT SERPL-MCNC: 6.8 G/DL (ref 6.1–8.1)
RBC # BLD AUTO: 4.64 MILLION/UL (ref 3.8–5.1)
SL AMB EGFR AFRICAN AMERICAN: 80 ML/MIN/1.73M2
SL AMB EGFR NON AFRICAN AMERICAN: 69 ML/MIN/1.73M2
SODIUM SERPL-SCNC: 137 MMOL/L (ref 135–146)
TRIGL SERPL-MCNC: 104 MG/DL
TSH SERPL-ACNC: 2.89 MIU/L (ref 0.4–4.5)
WBC # BLD AUTO: 4.4 THOUSAND/UL (ref 3.8–10.8)

## 2018-02-13 ENCOUNTER — TELEPHONE (OUTPATIENT)
Dept: FAMILY MEDICINE CLINIC | Facility: CLINIC | Age: 63
End: 2018-02-13

## 2018-02-14 NOTE — TELEPHONE ENCOUNTER
Spoke to pt , She is a little concerned re: her Glucose numbers, Is there anything she can do to help bring this down other than watch her Carbs and sugars?

## 2018-02-15 NOTE — TELEPHONE ENCOUNTER
I AM NOT TOO CONCERNED ABOUT HER BLOOD SUGAR,  Her blood sugar since 2013 to now is ranging between , normally should be less than 100, hemoglobin A1c done in 2015 and that was 5 6 and that is completely normal, patient to decrease her carb intake that definitely will help her numbers, she is not at diabetes risk at this point, will consider repeating hemoglobin A1c with her next blood work

## 2018-04-25 ENCOUNTER — TELEPHONE (OUTPATIENT)
Dept: OBGYN CLINIC | Facility: CLINIC | Age: 63
End: 2018-04-25

## 2018-04-25 NOTE — TELEPHONE ENCOUNTER
Looks like she was getting this rx from [address] [place] [address] in the past, as recently as 2/2018  I would recommend that she requests refills from her PCP given that she is also on lorazapam from PCP

## 2018-04-25 NOTE — TELEPHONE ENCOUNTER
Patient returned call - I advised her that we are not able to prescribe the medication, she will call her PCP to get it

## 2018-05-01 DIAGNOSIS — E78.2 MIXED HYPERLIPIDEMIA: ICD-10-CM

## 2018-05-02 RX ORDER — ATORVASTATIN CALCIUM 10 MG/1
10 TABLET, FILM COATED ORAL DAILY
Qty: 90 TABLET | Refills: 1 | Status: SHIPPED | OUTPATIENT
Start: 2018-05-02

## 2018-05-03 ENCOUNTER — OFFICE VISIT (OUTPATIENT)
Dept: FAMILY MEDICINE CLINIC | Facility: CLINIC | Age: 63
End: 2018-05-03
Payer: COMMERCIAL

## 2018-05-03 VITALS
DIASTOLIC BLOOD PRESSURE: 74 MMHG | HEART RATE: 62 BPM | WEIGHT: 156 LBS | BODY MASS INDEX: 23.64 KG/M2 | HEIGHT: 68 IN | SYSTOLIC BLOOD PRESSURE: 104 MMHG

## 2018-05-03 DIAGNOSIS — F41.1 GENERALIZED ANXIETY DISORDER: ICD-10-CM

## 2018-05-03 DIAGNOSIS — R73.9 HYPERGLYCEMIA: ICD-10-CM

## 2018-05-03 DIAGNOSIS — G47.00 INSOMNIA, UNSPECIFIED TYPE: ICD-10-CM

## 2018-05-03 DIAGNOSIS — E78.2 MIXED HYPERLIPIDEMIA: Primary | ICD-10-CM

## 2018-05-03 DIAGNOSIS — K21.9 GASTROESOPHAGEAL REFLUX DISEASE WITHOUT ESOPHAGITIS: ICD-10-CM

## 2018-05-03 LAB — SL AMB POCT HEMOGLOBIN AIC: 5.5

## 2018-05-03 PROCEDURE — 99214 OFFICE O/P EST MOD 30 MIN: CPT | Performed by: FAMILY MEDICINE

## 2018-05-03 PROCEDURE — 82962 GLUCOSE BLOOD TEST: CPT | Performed by: FAMILY MEDICINE

## 2018-05-03 PROCEDURE — 83036 HEMOGLOBIN GLYCOSYLATED A1C: CPT | Performed by: FAMILY MEDICINE

## 2018-05-03 RX ORDER — ZOLPIDEM TARTRATE 10 MG/1
5 TABLET ORAL
Qty: 30 TABLET | Refills: 0 | Status: SHIPPED | OUTPATIENT
Start: 2018-05-03 | End: 2018-09-18 | Stop reason: SDUPTHER

## 2018-05-03 RX ORDER — TRAZODONE HYDROCHLORIDE 50 MG/1
25 TABLET ORAL
Qty: 30 TABLET | Refills: 0 | Status: SHIPPED | OUTPATIENT
Start: 2018-05-03

## 2018-05-03 NOTE — PATIENT INSTRUCTIONS

## 2018-05-03 NOTE — PROGRESS NOTES
Assessment/Plan:    Hyperlipidemia  Her LDL is worsening although patient taking statin, patient taking statins twice a week due to intolerance, I advised patient to take it more on regular basis  To follow up on lipid panel in 6 months  Insomnia  It is a chronic problem, initially addressed by the gynecologist it was so due to her menopause symptoms, patient was prescribed Ambien 10 mg tablet she take kit intermittently and only have a tablet for long period of time, recently she I asked for refills and was deferred to us, did evaluate her sleep hygiene, patient tried different modalities with no help, prescription for Ambien 5 mg was given advised to use it cautiously, I suggested for patient to try using trazodone 25 mg every night as needed to help with her insomnia  Patient will give it a try, prescription was sent to the pharmacy  Hyperglycemia  HBA1C  Is 5 5 today   I reassured patient   Pt to cont with the healthy lifestyle  GERD (gastroesophageal reflux disease)  Stable continue with omeprazole  Diagnoses and all orders for this visit:    Mixed hyperlipidemia  -     Lipid panel; Future    Gastroesophageal reflux disease without esophagitis    Generalized anxiety disorder    Insomnia, unspecified type  -     zolpidem (AMBIEN) 10 mg tablet; Take 0 5 tablets (5 mg total) by mouth daily at bedtime as needed for sleep  -     traZODone (DESYREL) 50 mg tablet; Take 0 5 tablets (25 mg total) by mouth daily at bedtime as needed for sleep    Hyperglycemia  -     POCT hemoglobin A1c          Subjective: Follow up anxiety, GERD, hyperlipidemia, insomnia  Review BW results that were done in Feb 2018  Pt expresses concern about family hx of diabetes  - [facility]     Patient ID: Jillian Huffman is a 57 y o  female  Patient is here for follow-up on her chronic conditions include hyperlipidemia, anxiety, insomnia    Patient has significant family history of diabetes her brother and her father, patient was very concerned about diabetes, her fasting blood sugar was 102 consistently, she had hemoglobin A1c of 5 6 in 2015  Patient also concerned about her insomnia, she was seen Gynecology for this and it was so due to her menopausal symptoms, patient was given Ambien for long period of time by her gynecologist, lately the ask her to see the primary care physician for continuation of the treatment  Patient tried different modality include melatonin, proper sleep hygiene, decrease carbs and fluid intake before her sleep with no help, Ambien once or twice a week is the only medication that help her, she take only half a tablet of the 10 mg tablets  The following portions of the patient's history were reviewed and updated as appropriate: allergies, current medications, past family history, past medical history, past social history, past surgical history and problem list     Review of Systems   Constitutional: Negative for activity change, appetite change, chills, diaphoresis, fatigue and fever  HENT: Negative for congestion, postnasal drip, rhinorrhea, sinus pressure, sore throat and voice change  Eyes: Negative for pain and visual disturbance  Respiratory: Negative for cough, choking and chest tightness  Cardiovascular: Negative for chest pain and palpitations  Gastrointestinal: Negative for abdominal pain, constipation, diarrhea and nausea  Endocrine: Negative for cold intolerance, heat intolerance and polyuria  Genitourinary: Negative for dysuria, enuresis, flank pain and frequency  Musculoskeletal: Negative for gait problem, joint swelling, neck pain and neck stiffness  Skin: Negative for color change and wound  Neurological: Negative for dizziness, tremors, syncope, facial asymmetry, speech difficulty, weakness, light-headedness, numbness and headaches  Psychiatric/Behavioral: Positive for sleep disturbance  Negative for behavioral problems and confusion   The patient is not nervous/anxious  Objective    /74 (BP Location: Left arm, Patient Position: Sitting, Cuff Size: Standard)   Pulse 62   Ht 5' 8" (1 727 m)   Wt 70 8 kg (156 lb)   BMI 23 72 kg/m²        Physical Exam   Constitutional: She is oriented to person, place, and time  She appears well-developed and well-nourished  HENT:   Head: Normocephalic and atraumatic  Eyes: Conjunctivae and EOM are normal  Pupils are equal, round, and reactive to light  Neck: Normal range of motion  Neck supple  Cardiovascular: Normal rate, regular rhythm, normal heart sounds and intact distal pulses  Pulmonary/Chest: Effort normal and breath sounds normal    Abdominal: Soft  Bowel sounds are normal    Musculoskeletal: Normal range of motion  Neurological: She is alert and oriented to person, place, and time  She has normal reflexes  Skin: Skin is warm and dry  Psychiatric: She has a normal mood and affect  Her behavior is normal    Vitals reviewed

## 2018-05-03 NOTE — ASSESSMENT & PLAN NOTE
Her LDL is worsening although patient taking statin, patient taking statins twice a week due to intolerance, I advised patient to take it more on regular basis  To follow up on lipid panel in 6 months

## 2018-05-03 NOTE — ASSESSMENT & PLAN NOTE
It is a chronic problem, initially addressed by the gynecologist it was so due to her menopause symptoms, patient was prescribed Ambien 10 mg tablet she take kit intermittently and only have a tablet for long period of time, recently she I asked for refills and was deferred to us, did evaluate her sleep hygiene, patient tried different modalities with no help, prescription for Ambien 5 mg was given advised to use it cautiously, I suggested for patient to try using trazodone 25 mg every night as needed to help with her insomnia  Patient will give it a try, prescription was sent to the pharmacy

## 2018-05-10 ENCOUNTER — OFFICE VISIT (OUTPATIENT)
Dept: FAMILY MEDICINE CLINIC | Facility: CLINIC | Age: 63
End: 2018-05-10
Payer: COMMERCIAL

## 2018-05-10 VITALS
SYSTOLIC BLOOD PRESSURE: 108 MMHG | WEIGHT: 158.2 LBS | DIASTOLIC BLOOD PRESSURE: 70 MMHG | BODY MASS INDEX: 23.98 KG/M2 | HEART RATE: 64 BPM | HEIGHT: 68 IN | TEMPERATURE: 98.3 F

## 2018-05-10 DIAGNOSIS — R05.9 COUGH IN ADULT: Primary | ICD-10-CM

## 2018-05-10 DIAGNOSIS — H69.80 DYSFUNCTION OF EUSTACHIAN TUBE, UNSPECIFIED LATERALITY: ICD-10-CM

## 2018-05-10 DIAGNOSIS — K21.9 GASTROESOPHAGEAL REFLUX DISEASE WITHOUT ESOPHAGITIS: ICD-10-CM

## 2018-05-10 PROBLEM — R73.9 HYPERGLYCEMIA: Status: RESOLVED | Noted: 2018-05-03 | Resolved: 2018-05-10

## 2018-05-10 PROCEDURE — 92567 TYMPANOMETRY: CPT | Performed by: FAMILY MEDICINE

## 2018-05-10 PROCEDURE — 99213 OFFICE O/P EST LOW 20 MIN: CPT | Performed by: FAMILY MEDICINE

## 2018-05-10 RX ORDER — PROMETHAZINE HYDROCHLORIDE AND CODEINE PHOSPHATE 6.25; 1 MG/5ML; MG/5ML
5 SYRUP ORAL EVERY 4 HOURS PRN
Qty: 120 ML | Refills: 0 | Status: SHIPPED | OUTPATIENT
Start: 2018-05-10 | End: 2018-05-18 | Stop reason: ALTCHOICE

## 2018-05-10 NOTE — ASSESSMENT & PLAN NOTE
Stable wide on the right ear indicating mild effusion, advised to continue with the Flonase and Claritin

## 2018-05-10 NOTE — ASSESSMENT & PLAN NOTE
Seem to be allergy related advised to use over-the-counter Claritin for the next 7-10 days, cough suppressant was given to try if start affecting her sleep, lung examination is completely normal no evidence of acute infection  Patient was reassured, to call us if her symptoms not better to consider chest x-ray and antibiotic

## 2018-05-10 NOTE — PATIENT INSTRUCTIONS
Acute Cough   WHAT YOU NEED TO KNOW:   An acute cough can last up to 3 weeks  Common causes of an acute cough include a cold, allergies, or a lung infection  DISCHARGE INSTRUCTIONS:   Return to the emergency department if:   · You have trouble breathing or feel short of breath  · You cough up blood, or you see blood in your mucus  · You faint or feel weak or dizzy  · You have chest pain when you cough or take a deep breath  · You have new wheezing  Contact your healthcare provider if:   · You have a fever  · Your cough lasts longer than 4 weeks  · Your symptoms do not improve with treatment  · You have questions or concerns about your condition or care  Medicines:   · Medicines  may be needed to stop the cough, decrease swelling in your airways, or help open your airways  Medicine may also be given to help you cough up mucus  Ask your healthcare provider what over-the-counter medicines you can take  If you have an infection caused by bacteria, you may need antibiotics  · Take your medicine as directed  Contact your healthcare provider if you think your medicine is not helping or if you have side effects  Tell him or her if you are allergic to any medicine  Keep a list of the medicines, vitamins, and herbs you take  Include the amounts, and when and why you take them  Bring the list or the pill bottles to follow-up visits  Carry your medicine list with you in case of an emergency  Manage your symptoms:   · Do not smoke and stay away from others who smoke  Nicotine and other chemicals in cigarettes and cigars can cause lung damage and make your cough worse  Ask your healthcare provider for information if you currently smoke and need help to quit  E-cigarettes or smokeless tobacco still contain nicotine  Talk to your healthcare provider before you use these products  · Drink extra liquids as directed  Liquids will help thin and loosen mucus so you can cough it up   Liquids will also help prevent dehydration  Examples of good liquids to drink include water, fruit juice, and broth  Do not drink liquids that contain caffeine  Caffeine can increase your risk for dehydration  Ask your healthcare provider how much liquid to drink each day  · Rest as directed  Do not do activities that make your cough worse, such as exercise  · Use a humidifier or vaporizer  Use a cool mist humidifier or a vaporizer to increase air moisture in your home  This may make it easier for you to breathe and help decrease your cough  · Eat 2 to 5 mL of honey 2 times each day  Honey can help thin mucus and decrease your cough  · Use cough drops or lozenges  These can help decrease throat irritation and your cough  Follow up with your healthcare provider as directed:  Write down your questions so you remember to ask them during your visits  © 2017 [employer] Information is for End User's use only and may not be sold, redistributed or otherwise used for commercial purposes  All illustrations and images included in CareNotes® are the copyrighted property of [employer]  or [employer]ison [employer]  The above information is an  only  It is not intended as medical advice for individual conditions or treatments  Talk to your doctor, nurse or pharmacist before following any medical regimen to see if it is safe and effective for you

## 2018-05-10 NOTE — PROGRESS NOTES
Assessment/Plan:    Cough in adult  Seem to be allergy related advised to use over-the-counter Claritin for the next 7-10 days, cough suppressant was given to try if start affecting her sleep, lung examination is completely normal no evidence of acute infection  Patient was reassured, to call us if her symptoms not better to consider chest x-ray and antibiotic  Dysfunction of eustachian tube  Stable wide on the right ear indicating mild effusion, advised to continue with the Flonase and Claritin  GERD (gastroesophageal reflux disease)  Stable continue with the omeprazole 20 mg  Insomnia  Stable continue with Ambien refills was given       Diagnoses and all orders for this visit:    Cough in adult  -     promethazine-codeine (PHENERGAN WITH CODEINE) 6 25-10 mg/5 mL syrup; Take 5 mL by mouth every 4 (four) hours as needed for cough    Dysfunction of Eustachian tube, unspecified laterality  -     Cancel: Tympanometry; Future  -     Tympanometry    Gastroesophageal reflux disease without esophagitis          Subjective: pt is here for a tickle like cough for 5-7 days~cd     Patient ID: Juany Lainez is a 57 y o  female  Patient is here for cough for the last week, getting worse, is productive, the phlegm is all clear, some postnasal drip, feel her ears are muffled, denied any ear pain denied any fever chills denied any chest pain or chest tightness or wheezing  Patient did not try any medication yet  Patient would like to make sure it is not getting worse  The following portions of the patient's history were reviewed and updated as appropriate: allergies, current medications, past family history, past medical history, past social history, past surgical history and problem list     Review of Systems   Constitutional: Positive for fatigue  Negative for activity change, appetite change, chills and fever     HENT: Negative for congestion, drooling, ear discharge, ear pain, rhinorrhea, sinus pain, sneezing, sore throat, trouble swallowing and voice change  Respiratory: Positive for cough  Negative for chest tightness, shortness of breath and wheezing  Cardiovascular: Negative for chest pain and leg swelling  Gastrointestinal: Negative for abdominal pain, diarrhea, nausea and vomiting  Genitourinary: Negative for dysuria  Musculoskeletal: Negative for neck pain  Skin: Negative for rash  Neurological: Negative for headaches  Objective:  Vitals:    05/10/18 1303   BP: 108/70   BP Location: Left arm   Patient Position: Sitting   Cuff Size: Standard   Pulse: 64   Temp: 98 3 °F (36 8 °C)   Weight: 71 8 kg (158 lb 3 2 oz)   Height: 5' 8" (1 727 m)          Physical Exam   Constitutional: She is oriented to person, place, and time  She appears well-developed and well-nourished  HENT:   Head: Normocephalic and atraumatic  Eyes: Conjunctivae and EOM are normal  Pupils are equal, round, and reactive to light  Neck: Normal range of motion  Neck supple  Cardiovascular: Normal rate, regular rhythm, normal heart sounds and intact distal pulses  Pulmonary/Chest: Effort normal and breath sounds normal    Abdominal: Soft  Bowel sounds are normal    Musculoskeletal: Normal range of motion  Neurological: She is alert and oriented to person, place, and time  She has normal reflexes  Skin: Skin is warm and dry  Psychiatric: She has a normal mood and affect  Her behavior is normal    Vitals reviewed  Tympanometry     Date/Time 5/10/2018 2:40 PM     Performed by  Joe Amaya by Angel Luis Ramachandran       Consent: Verbal consent obtained  Procedure Details   Procedure Notes: Tympanogram was performed  Patient tolerated procedure  Please see scanned results

## 2018-05-15 ENCOUNTER — TELEPHONE (OUTPATIENT)
Dept: FAMILY MEDICINE CLINIC | Facility: CLINIC | Age: 63
End: 2018-05-15

## 2018-05-15 DIAGNOSIS — R05.9 COUGH IN ADULT: Primary | ICD-10-CM

## 2018-05-15 RX ORDER — AZITHROMYCIN 250 MG/1
TABLET, FILM COATED ORAL
Qty: 6 TABLET | Refills: 0 | Status: SHIPPED | OUTPATIENT
Start: 2018-05-15 | End: 2018-05-19

## 2018-05-15 NOTE — TELEPHONE ENCOUNTER
Patient said that he cough is worse from when she came in for her visit  She was wondering if there is something else that could be called in for her       [address]

## 2018-05-15 NOTE — TELEPHONE ENCOUNTER
Please call patient and advise that azithromycin was sent to the pharmacy 2 tablets on day 1 and then 1 tablet daily for 4 days afterward  Advised on the side effect of the medication include GI upset, to use probiotic with antibiotic

## 2018-05-15 NOTE — TELEPHONE ENCOUNTER
LMOM for patient that Dr Isauro Uriostegui sent an antibiotic to her pharmacy and also that she should take if possible, a probiotic with this antibiotic due to possible GI upset  Patient can call us with any questions

## 2018-05-18 ENCOUNTER — ANNUAL EXAM (OUTPATIENT)
Dept: OBGYN CLINIC | Facility: CLINIC | Age: 63
End: 2018-05-18
Payer: COMMERCIAL

## 2018-05-18 VITALS
DIASTOLIC BLOOD PRESSURE: 76 MMHG | HEIGHT: 67 IN | BODY MASS INDEX: 24.64 KG/M2 | WEIGHT: 157 LBS | SYSTOLIC BLOOD PRESSURE: 122 MMHG

## 2018-05-18 DIAGNOSIS — Z91.89 HIGH RISK OF OVARIAN CANCER: ICD-10-CM

## 2018-05-18 DIAGNOSIS — Z12.39 ENCOUNTER FOR SCREENING FOR MALIGNANT NEOPLASM OF BREAST: ICD-10-CM

## 2018-05-18 DIAGNOSIS — Z01.419 ENCOUNTER FOR GYNECOLOGICAL EXAMINATION (GENERAL) (ROUTINE) WITHOUT ABNORMAL FINDINGS: Primary | ICD-10-CM

## 2018-05-18 PROCEDURE — S0612 ANNUAL GYNECOLOGICAL EXAMINA: HCPCS | Performed by: NURSE PRACTITIONER

## 2018-05-18 NOTE — PROGRESS NOTES
Assessment/Plan:    Encounter for gynecological examination (general) (routine) without abnormal findings  Benign findings on routine gyn exam  Recommended monthly SBE, annual CBE and annual screening mammo  ASCCP guidelines reviewed and pap with cotesting noted to be up to date; this low risk patient was advised she meets criteria to d/c pap screening at age 70  DEXA and colonoscopy noted to be up to date  The patient denies STI risk factors and declines testing at this time  Reviewed diet/activity recommendations  Discussed postmenopausal considerations and symptoms to report  RTO in one year for routine annual gyn exam or sooner PRN  High risk of ovarian cancer  H/o infertility treatment for 8 years  The patient has been counseled re: increased risk of ovarian cancer for life  She declines pelvic US for adnexal surveillance, as previously ordered by Dr Lila Kang, although she does agree to daily   Reviewed the limitations of this test  She is aware of sx to report  Diagnoses and all orders for this visit:    Encounter for gynecological examination (general) (routine) without abnormal findings    Encounter for screening for malignant neoplasm of breast  -     Mammo screening bilateral w cad; Future    High risk of ovarian cancer  -     ; Future          Subjective:      Patient ID: Kelsey Simon is a 57 y o  female  This patient presents for routine annual gyn exam   Known prolapse is stable and unchanged on self exam    She denies acute gyn complaints  She denies  bleeding or spotting, VM sx, pelvic pain, breast concerns, abn discharge, bowel/bladder dysfunction, depression/anx   and monog  Denies STI concerns                The following portions of the patient's history were reviewed and updated as appropriate: allergies, current medications, past family history, past medical history, past social history, past surgical history and problem list     Review of Systems Constitutional: Negative  HENT: Negative  Eyes: Negative  Respiratory: Negative  Cardiovascular: Negative  Gastrointestinal: Negative  Endocrine: Negative  Genitourinary: Negative  Musculoskeletal: Negative  Skin: Negative  Allergic/Immunologic: Negative  Neurological: Negative  Hematological: Negative  Psychiatric/Behavioral: Negative  Objective:      /76 (BP Location: Left arm)   Ht 5' 6 5" (1 689 m)   Wt 71 2 kg (157 lb)   LMP  (LMP Unknown)   BMI 24 96 kg/m²          Physical Exam   Constitutional: She is oriented to person, place, and time  She appears well-developed and well-nourished  HENT:   Head: Normocephalic and atraumatic  Eyes: EOM are normal  Pupils are equal, round, and reactive to light  Neck: Normal range of motion  Neck supple  No thyromegaly present  Cardiovascular: Normal rate, regular rhythm and normal heart sounds  Pulmonary/Chest: Effort normal and breath sounds normal  No respiratory distress  She has no wheezes  She has no rales  She exhibits no mass, no tenderness and no deformity  Right breast exhibits no inverted nipple, no mass, no nipple discharge, no skin change and no tenderness  Left breast exhibits no inverted nipple, no mass, no nipple discharge, no skin change and no tenderness  Breasts are symmetrical    Abdominal: Soft  She exhibits no distension and no mass  There is no splenomegaly or hepatomegaly  There is no tenderness  There is no rebound and no guarding  Genitourinary: Rectum normal, vagina normal and uterus normal        No breast swelling, tenderness or discharge  No labial fusion  There is no rash, tenderness, lesion or injury on the right labia  There is no rash, tenderness, lesion or injury on the left labia  Cervix exhibits no motion tenderness, no discharge and no friability  Right adnexum displays no mass, no tenderness and no fullness   Left adnexum displays no mass, no tenderness and no fullness  No erythema, tenderness or bleeding in the vagina  No foreign body in the vagina  No vaginal discharge found  Musculoskeletal: Normal range of motion  Lymphadenopathy:     She has no cervical adenopathy  She has no axillary adenopathy  Neurological: She is alert and oriented to person, place, and time  No cranial nerve deficit  Skin: Skin is warm and dry  No rash noted  No cyanosis  Nails show no clubbing  Psychiatric: She has a normal mood and affect   Her speech is normal and behavior is normal  Judgment and thought content normal  Cognition and memory are normal

## 2018-05-18 NOTE — ASSESSMENT & PLAN NOTE
H/o infertility treatment for 8 years  The patient has been counseled re: increased risk of ovarian cancer for life  She declines pelvic US for adnexal surveillance, as previously ordered by Dr Dariana Pandya, although she does agree to daily   Reviewed the limitations of this test  She is aware of sx to report

## 2018-05-18 NOTE — ASSESSMENT & PLAN NOTE
Benign findings on routine gyn exam  Recommended monthly SBE, annual CBE and annual screening mammo  ASCCP guidelines reviewed and pap with cotesting noted to be up to date; this low risk patient was advised she meets criteria to d/c pap screening at age 70  DEXA and colonoscopy noted to be up to date  The patient denies STI risk factors and declines testing at this time  Reviewed diet/activity recommendations  Discussed postmenopausal considerations and symptoms to report  RTO in one year for routine annual gyn exam or sooner PRN

## 2018-05-22 ENCOUNTER — TELEPHONE (OUTPATIENT)
Dept: OBGYN CLINIC | Facility: CLINIC | Age: 63
End: 2018-05-22

## 2018-05-29 ENCOUNTER — TELEPHONE (OUTPATIENT)
Dept: FAMILY MEDICINE CLINIC | Facility: CLINIC | Age: 63
End: 2018-05-29

## 2018-05-29 DIAGNOSIS — R05.9 COUGH IN ADULT: Primary | ICD-10-CM

## 2018-05-30 RX ORDER — PREDNISONE 20 MG/1
20 TABLET ORAL DAILY
Qty: 5 TABLET | Refills: 0 | Status: SHIPPED | OUTPATIENT
Start: 2018-05-30 | End: 2018-06-04

## 2018-07-10 ENCOUNTER — TELEPHONE (OUTPATIENT)
Dept: OBGYN CLINIC | Facility: CLINIC | Age: 63
End: 2018-07-10

## 2018-07-10 NOTE — TELEPHONE ENCOUNTER
Pt seen for [facility] lab today-calling for add'l info for dx code since pt has medicare now   Dx they have is Z91 [id number]

## 2018-07-10 NOTE — TELEPHONE ENCOUNTER
Melissa Deluca said she was looking into this as well - please let me know if we come up with anything  Thanks

## 2018-07-10 NOTE — TELEPHONE ENCOUNTER
I left a message for [facility], asking Flaca's question  Patient would not be aware, we got the call from the lab

## 2018-07-10 NOTE — TELEPHONE ENCOUNTER
[place] got a call back from [facility], they will hold blood draw until tomorrow until they hear back from us

## 2018-07-10 NOTE — TELEPHONE ENCOUNTER
Oh, I misunderstood - thought the patient was calling  Is it possible to contact medicare to inquire as to what codes they will cover for this test? Or perhaps there is an online resource where [facility] the diagnostic codes they accept?    Thanks

## 2018-07-11 ENCOUNTER — TELEPHONE (OUTPATIENT)
Dept: OBGYN CLINIC | Facility: CLINIC | Age: 63
End: 2018-07-11

## 2018-07-11 NOTE — TELEPHONE ENCOUNTER
Spoke with patient, states she is having bloating and also has a family history of ovarian cancer  Patient also has a secondary insurance-HBS [id number], Gp# [id number]   LM for patient for [facility] to call office

## 2018-07-12 NOTE — TELEPHONE ENCOUNTER
Left 2 messages at [facility] with secondary insurance info, requested call back, no response as of now

## 2018-07-24 ENCOUNTER — OFFICE VISIT (OUTPATIENT)
Dept: FAMILY MEDICINE CLINIC | Facility: CLINIC | Age: 63
End: 2018-07-24
Payer: MEDICARE

## 2018-07-24 VITALS
SYSTOLIC BLOOD PRESSURE: 116 MMHG | HEART RATE: 56 BPM | HEIGHT: 68 IN | WEIGHT: 157.8 LBS | BODY MASS INDEX: 23.92 KG/M2 | DIASTOLIC BLOOD PRESSURE: 68 MMHG

## 2018-07-24 DIAGNOSIS — G47.00 INSOMNIA, UNSPECIFIED TYPE: ICD-10-CM

## 2018-07-24 DIAGNOSIS — K21.9 GASTROESOPHAGEAL REFLUX DISEASE WITHOUT ESOPHAGITIS: ICD-10-CM

## 2018-07-24 DIAGNOSIS — Z23 NEED FOR SHINGLES VACCINE: ICD-10-CM

## 2018-07-24 DIAGNOSIS — F41.1 GENERALIZED ANXIETY DISORDER: Primary | ICD-10-CM

## 2018-07-24 DIAGNOSIS — E78.2 MIXED HYPERLIPIDEMIA: ICD-10-CM

## 2018-07-24 PROBLEM — R05.9 COUGH IN ADULT: Status: RESOLVED | Noted: 2018-05-10 | Resolved: 2018-07-24

## 2018-07-24 PROCEDURE — 99214 OFFICE O/P EST MOD 30 MIN: CPT | Performed by: FAMILY MEDICINE

## 2018-07-24 RX ORDER — LORAZEPAM 0.5 MG/1
0.5 TABLET ORAL DAILY
Qty: 30 TABLET | Refills: 0 | Status: SHIPPED | OUTPATIENT
Start: 2018-07-24 | End: 2018-09-18 | Stop reason: SDUPTHER

## 2018-07-24 NOTE — ASSESSMENT & PLAN NOTE
This is managed by her OBGYN, patient was doing  for the last 6 years and was always normal, patient has no symptoms of bloating or any other symptoms

## 2018-07-24 NOTE — ASSESSMENT & PLAN NOTE
Patient use Ambien very randomly  Refills to be maintained for the next 6 months until seen by primary care physician in [facility]

## 2018-07-24 NOTE — ASSESSMENT & PLAN NOTE
Anxiety is very well controlled, patient use Ativan on as needed basis and she use it wisely  Patient was concerned since she is moving to [address] [place] [address] and if she find a new primary care physician  Controlled substance contract was signed with the patient  I will be able to provide patient with her refills for the next 6 months until seen by primary care physician in [address] [place] [address]  Patient to call us with her preferred pharmacy down in [address] [place] [address]  Discussed with patient about the controlled substance contract and she understand and agree to it  No further refills after 6 months if patient settled in [address] [place] [address]

## 2018-07-24 NOTE — PROGRESS NOTES
Assessment/Plan:    Anxiety disorder  Anxiety is very well controlled, patient use Ativan on as needed basis and she use it wisely  Patient was concerned since she is moving to [place] and if she find a new primary care physician  Controlled substance contract was signed with the patient  I will be able to provide patient with her refills for the next 6 months until seen by primary care physician in [facility]  Patient to call us with her preferred pharmacy down in [place] area  Discussed with patient about the controlled substance contract and she understand and agree to it  No further refills after 6 months if patient settled in [place] area  Hyperlipidemia  Stable, continue with atorvastatin, follow up on lipid panel  Insomnia  Patient use Ambien very randomly  Refills to be maintained for the next 6 months until seen by primary care physician in [facility]  High risk of ovarian cancer  This is managed by her OBGYN, patient was doing  for the last 6 years and was always normal, patient has no symptoms of bloating or any other symptoms  GERD (gastroesophageal reflux disease)  Was seen by GI recently, had an upper endoscopy which was normal, patient to continue on omeprazole  Diagnoses and all orders for this visit:    Generalized anxiety disorder  -     LORazepam (ATIVAN) 0 5 mg tablet; Take 1 tablet (0 5 mg total) by mouth daily  -     Comprehensive metabolic panel; Future    Insomnia, unspecified type    Gastroesophageal reflux disease without esophagitis  -     CBC and differential; Future    Need for shingles vaccine  -     Zoster Vac Recomb Adjuvanted 50 MCG SUSR; Inject 1 Dose into a muscle once for 1 dose    Mixed hyperlipidemia  -     TSH, 3rd generation with Free T4 reflex; Future  -     Lipid panel; Future          Subjective: Follow up and discuss medications  kw     Patient ID: Taj Wynne is a 70 y o  female      Patient is here for follow-up on her chronic conditions include hyperlipidemia, anxiety, insomnia, Trygve Oanh, patient is doing well overall, patient is moving to [facility], she sold her house, she is moving next week, she was concerned about her prescription until she find a primary care physician down there  The following portions of the patient's history were reviewed and updated as appropriate: allergies, current medications, past family history, past medical history, past social history, past surgical history and problem list     Review of Systems   Constitutional: Negative for appetite change, chills and fever  HENT: Negative for congestion, sore throat and voice change  Eyes: Negative for pain and visual disturbance  Respiratory: Negative for cough  Cardiovascular: Negative for chest pain and palpitations  Gastrointestinal: Negative for abdominal pain, constipation, diarrhea and nausea  Endocrine: Negative for cold intolerance, heat intolerance and polyuria  Genitourinary: Negative for dysuria, enuresis, flank pain and frequency  Musculoskeletal: Negative for gait problem, joint swelling and neck pain  Skin: Negative for color change and wound  Neurological: Negative for dizziness, syncope, speech difficulty, numbness and headaches  Psychiatric/Behavioral: Negative for behavioral problems and confusion  The patient is not nervous/anxious  Objective:      /68 (BP Location: Left arm)   Pulse 56   Ht 5' 8" (1 727 m)   Wt 71 6 kg (157 lb 12 8 oz)   LMP  (LMP Unknown)   BMI 23 99 kg/m²          Physical Exam   Constitutional: She is oriented to person, place, and time  She appears well-developed and well-nourished  HENT:   Head: Normocephalic and atraumatic  Eyes: Conjunctivae and EOM are normal  Pupils are equal, round, and reactive to light  Neck: Normal range of motion  Neck supple     Cardiovascular: Normal rate, regular rhythm, normal heart sounds and intact distal pulses  Pulmonary/Chest: Effort normal and breath sounds normal    Abdominal: Soft  Bowel sounds are normal    Musculoskeletal: Normal range of motion  Neurological: She is alert and oriented to person, place, and time  She has normal reflexes  Skin: Skin is warm and dry  Psychiatric: She has a normal mood and affect  Her behavior is normal    Vitals reviewed

## 2018-07-24 NOTE — PATIENT INSTRUCTIONS
Insomnia   AMBULATORY CARE:   Insomnia  is a condition that makes it hard to fall or stay asleep  Lack of sleep can lead to attention or memory problems during the day  You may also be blunt, depressed, clumsy, or have headaches  Contact your healthcare provider if:   · Your symptoms do not get better, or they get worse  · You begin to use drugs or alcohol to fall asleep  · You have questions or concerns about your condition or care  Medicines:   · Medicines  may help you sleep more regularly or help you feel less anxious  · Take your medicine as directed  Contact your healthcare provider if you think your medicine is not helping or if you have side effects  Tell him or her if you are allergic to any medicine  Keep a list of the medicines, vitamins, and herbs you take  Include the amounts, and when and why you take them  Bring the list or the pill bottles to follow-up visits  Carry your medicine list with you in case of an emergency  What you can do to improve your sleep:   · Create a sleep schedule  This will help you form a sleep routine  Keep a record of your sleep patterns, and any sleeping problems you have  Bring the record to follow-up visits with healthcare providers  · Do not take naps  Naps could make it hard for you to fall asleep at bedtime  · Keep your bedroom cool, quiet, and dark  Turn on white noise, such as a fan, to help you relax  Do not use your bed for any activity that will keep you awake  Do not read, exercise, eat, or watch TV in your bedroom  · Get up if you do not fall asleep within 20 minutes  Move to another room and do something relaxing until you become sleepy  · Limit caffeine, alcohol, and food to earlier in the day  Only drink caffeine in the morning  Do not drink alcohol within 6 hours of bedtime  Do not eat a heavy meal right before you go to bed  · Exercise regularly  Daily exercise may help you sleep better   Do not exercise within 4 hours of bedtime  Follow up with your healthcare provider as directed: Your healthcare provider may refer you for cognitive behavioral therapy  A behavioral therapist may help you find ways to relax, decrease stress, and improve sleep  Write down your questions so you remember to ask them during your visits  © 2017 [facility] Information is for End User's use only and may not be sold, redistributed or otherwise used for commercial purposes  All illustrations and images included in CareNotes® are the copyrighted property of [employer] , [employer]  or Jeison Corado  The above information is an  only  It is not intended as medical advice for individual conditions or treatments  Talk to your doctor, nurse or pharmacist before following any medical regimen to see if it is safe and effective for you

## 2018-07-24 NOTE — ASSESSMENT & PLAN NOTE
Was seen by GI recently, had an upper endoscopy which was normal, patient to continue on omeprazole

## 2018-09-18 DIAGNOSIS — G47.00 INSOMNIA, UNSPECIFIED TYPE: ICD-10-CM

## 2018-09-18 DIAGNOSIS — F41.1 GENERALIZED ANXIETY DISORDER: ICD-10-CM

## 2018-09-19 RX ORDER — LORAZEPAM 0.5 MG/1
0.5 TABLET ORAL DAILY
Qty: 30 TABLET | Refills: 0 | Status: SHIPPED | OUTPATIENT
Start: 2018-09-19

## 2018-09-19 RX ORDER — ZOLPIDEM TARTRATE 10 MG/1
5 TABLET ORAL
Qty: 30 TABLET | Refills: 0 | Status: SHIPPED | OUTPATIENT
Start: 2018-09-19

## 2018-09-21 ENCOUNTER — TELEPHONE (OUTPATIENT)
Dept: OBGYN CLINIC | Facility: CLINIC | Age: 63
End: 2018-09-21

## 2018-09-21 NOTE — TELEPHONE ENCOUNTER
RAFY    Spoke with Pt today via phone call  Pt states she recently spoke  with staff from [address]  Pt further states [facility] staff is currently working on obtaining prior mammographic films for comparison, will contact Pt after said films are obtained and inform Pt of recommended additional breast imaging  Pt states she will contact office upon receipt of recommendation from [facility]

## 2018-09-21 NOTE — TELEPHONE ENCOUNTER
----- Message from Riccardo Bailey, 10 Zachia  sent at 9/21/2018 10:28 AM EDT -----  Left breast with asymmetry   Per report the plan was to obtain prior images for comparison   Please contact the imaging center to inquire as to whether additional orders are needed for f/u imaging, then contact the patient to confirm that she is aware of plan

## 2018-09-28 ENCOUNTER — TELEPHONE (OUTPATIENT)
Dept: OBGYN CLINIC | Facility: CLINIC | Age: 63
End: 2018-09-28

## 2018-09-28 DIAGNOSIS — R92.8 ABNORMAL MAMMOGRAM: Primary | ICD-10-CM

## 2018-09-28 NOTE — TELEPHONE ENCOUNTER
Fabricio Youssef called from [facility] from [address] they need a right diagnostic mammo with ultrasound if needed faxed to them Fax [phone]

## 2018-09-28 NOTE — TELEPHONE ENCOUNTER
Spoke with "Sarmad Lord"  of [address] [place] [address] imaging center today via phone call  She states Pt needs Rx for additional breast imaging at their center  Radiology orders for diagnostic mammogram of right breast w cad and diagnostic ultrasound of right breast completed in EPIC  Copies of said orders faxed via EPIC to fax# [phone]

## 2018-10-03 ENCOUNTER — TELEPHONE (OUTPATIENT)
Dept: OBGYN CLINIC | Facility: CLINIC | Age: 63
End: 2018-10-03

## 2018-10-03 NOTE — TELEPHONE ENCOUNTER
----- Message from Agusto Engel, 10 Luis Simon sent at 10/3/2018 12:55 PM EDT -----  Normal findings on follow up breast imaging   Please confirm that the patient is aware   Ok to resume annual screening mammo

## 2018-10-03 NOTE — TELEPHONE ENCOUNTER
Patient returned call - she was already aware of results and that she can return to normal annual mammo screenings

## 2020-02-20 ENCOUNTER — ANNUAL EXAM (OUTPATIENT)
Dept: OBGYN CLINIC | Facility: CLINIC | Age: 65
End: 2020-02-20
Payer: MEDICARE

## 2020-02-20 VITALS — WEIGHT: 158 LBS | SYSTOLIC BLOOD PRESSURE: 158 MMHG | BODY MASS INDEX: 24.02 KG/M2 | DIASTOLIC BLOOD PRESSURE: 84 MMHG

## 2020-02-20 DIAGNOSIS — Z12.31 ENCOUNTER FOR SCREENING MAMMOGRAM FOR MALIGNANT NEOPLASM OF BREAST: ICD-10-CM

## 2020-02-20 DIAGNOSIS — Z91.89 HIGH RISK OF OVARIAN CANCER: ICD-10-CM

## 2020-02-20 DIAGNOSIS — Z01.419 ENCOUNTER FOR GYNECOLOGICAL EXAMINATION (GENERAL) (ROUTINE) WITHOUT ABNORMAL FINDINGS: Primary | ICD-10-CM

## 2020-02-20 PROCEDURE — G0101 CA SCREEN;PELVIC/BREAST EXAM: HCPCS | Performed by: NURSE PRACTITIONER

## 2020-02-20 PROCEDURE — G0145 SCR C/V CYTO,THINLAYER,RESCR: HCPCS | Performed by: NURSE PRACTITIONER

## 2020-02-20 NOTE — PROGRESS NOTES
Assessment/Plan:    High risk of ovarian cancer  Normal examination today  Reviewed lack of screening guidelines for ovarian ca  Order was provided today for ; she is aware this may not be covered by insurance again  She declines pelvic US  Encounter for gynecological examination (general) (routine) without abnormal findings  Benign findings on routine gyn exam  Recommended monthly SBE, annual CBE and annual screening mammo  ASCCP guidelines reviewed and pap was collected today; this low risk patient was advised she meets criteria to d/c pap screening given age >71  Colonoscopy noted to be up to date  The patient denies STI risk factors and declines testing at this time  Reviewed diet/activity recommendations:  Encouraged daily Ca++ and vitamin D intake as well as daily weight bearing exercise for promotion of bone health    Discussed postmenopausal considerations and symptoms to report  RTO in one year for routine annual gyn exam or sooner PRN  Diagnoses and all orders for this visit:    Encounter for gynecological examination (general) (routine) without abnormal findings    Encounter for screening mammogram for malignant neoplasm of breast  -     Mammo screening bilateral w 3d & cad; Future    High risk of ovarian cancer  -     ; Future          Subjective:      Patient ID: Saritha Craven is a 75 y o  female  This patient presents for routine annual gyn exam   Medically stable  H/o extensive infertility treatments in the past and was advised of increased risk of ovarian ca; she would like to continue   She denies acute gyn complaints  She denies  bleeding or spotting, VM sx, pelvic pain, breast concerns, abn discharge, bowel/bladder dysfunction, depression/anx   and monog  Denies STI concerns  Moved to [facility] from [facility], then  contracted Lyme and was very ill   He is improved but they have decided to return to [facility] and close on a house there next week The following portions of the patient's history were reviewed and updated as appropriate: allergies, current medications, past family history, past medical history, past social history, past surgical history and problem list     Review of Systems   Constitutional: Negative  Respiratory: Negative  Cardiovascular: Negative  Gastrointestinal: Negative  Genitourinary: Negative  Musculoskeletal: Negative  Skin: Negative  Neurological: Negative  Psychiatric/Behavioral: Negative  Objective:      /84   Wt 71 7 kg (158 lb)   LMP  (LMP Unknown)   BMI 24 02 kg/m²          Physical Exam   Constitutional: She is oriented to person, place, and time  She appears well-developed and well-nourished  HENT:   Head: Normocephalic and atraumatic  Eyes: Pupils are equal, round, and reactive to light  EOM are normal    Neck: Normal range of motion  Neck supple  No thyromegaly present  Cardiovascular: Normal rate, regular rhythm and normal heart sounds  Pulmonary/Chest: Effort normal and breath sounds normal  No respiratory distress  She has no wheezes  She has no rales  She exhibits no mass, no tenderness and no deformity  Right breast exhibits no inverted nipple, no mass, no nipple discharge, no skin change and no tenderness  Left breast exhibits no inverted nipple, no mass, no nipple discharge, no skin change and no tenderness  No breast tenderness or discharge  Breasts are symmetrical    Abdominal: Soft  She exhibits no distension and no mass  There is no splenomegaly or hepatomegaly  There is no tenderness  There is no rebound and no guarding  Genitourinary: Rectum normal, vagina normal and uterus normal        No breast tenderness or discharge  No labial fusion  There is no rash, tenderness, lesion or injury on the right labia  There is no rash, tenderness, lesion or injury on the left labia  Cervix exhibits no motion tenderness, no discharge and no friability   Right adnexum displays no mass, no tenderness and no fullness  Left adnexum displays no mass, no tenderness and no fullness  No erythema, tenderness or bleeding in the vagina  No foreign body in the vagina  No vaginal discharge found  Musculoskeletal: Normal range of motion  Lymphadenopathy:     She has no cervical adenopathy  She has no axillary adenopathy  Neurological: She is alert and oriented to person, place, and time  No cranial nerve deficit  Skin: Skin is warm and dry  No rash noted  No cyanosis  Nails show no clubbing  Psychiatric: She has a normal mood and affect   Her speech is normal and behavior is normal  Judgment and thought content normal  Cognition and memory are normal

## 2020-02-20 NOTE — ASSESSMENT & PLAN NOTE
Normal examination today  Reviewed lack of screening guidelines for ovarian ca  Order was provided today for ; she is aware this may not be covered by insurance again  She declines pelvic US

## 2020-02-20 NOTE — ASSESSMENT & PLAN NOTE
Benign findings on routine gyn exam  Recommended monthly SBE, annual CBE and annual screening mammo  ASCCP guidelines reviewed and pap was collected today; this low risk patient was advised she meets criteria to d/c pap screening given age >71  Colonoscopy noted to be up to date  The patient denies STI risk factors and declines testing at this time  Reviewed diet/activity recommendations:  Encouraged daily Ca++ and vitamin D intake as well as daily weight bearing exercise for promotion of bone health    Discussed postmenopausal considerations and symptoms to report  RTO in one year for routine annual gyn exam or sooner PRN

## 2020-02-21 ENCOUNTER — APPOINTMENT (OUTPATIENT)
Dept: LAB | Facility: MEDICAL CENTER | Age: 65
End: 2020-02-21
Payer: MEDICARE

## 2020-02-21 DIAGNOSIS — Z91.89 HIGH RISK OF OVARIAN CANCER: ICD-10-CM

## 2020-02-21 LAB — CANCER AG125 SERPL-ACNC: 7.6 U/ML (ref 0–30)

## 2020-02-21 PROCEDURE — 36415 COLL VENOUS BLD VENIPUNCTURE: CPT

## 2020-02-21 PROCEDURE — 86304 IMMUNOASSAY TUMOR CA 125: CPT

## 2020-02-26 LAB
LAB AP GYN PRIMARY INTERPRETATION: NORMAL
Lab: NORMAL

## 2020-02-28 ENCOUNTER — TELEPHONE (OUTPATIENT)
Dept: OBGYN CLINIC | Facility: CLINIC | Age: 65
End: 2020-02-28

## 2020-02-28 NOTE — TELEPHONE ENCOUNTER
Patient aware  normal  To call with any pelvic pain,vaginal bleeding or bloating  Patient verbalizes understanding

## 2020-02-28 NOTE — TELEPHONE ENCOUNTER
----- Message from Gloria Yang Yamilka JohnsonAlton sent at 2/28/2020  2:23 PM EST -----  Regarding: FW: Pt needs to be called       ----- Message -----  From: Umu Mccann MA  Sent: 2/28/2020   8:30 AM EST  To: Jacqueline Dalal MA  Subject: FW: Pt needs to be called                        Please call pt per yaneth COLON's should be calling normals   Please call   ----- Message -----  From: Jacqueline Dalal MA  Sent: 2/27/2020   8:49 AM EST  To: [facility]  Subject: Pt needs to be called                            Pt needs to be called per Tamiko Marvin  ----- Message -----  From: Umu Mccann MA  Sent: 2/26/2020   5:32 PM EST  To: Jacqueline Dalal MA        ----- Message -----  From: DOUG Jose  Sent: 2/25/2020  12:24 PM EST  To: [facility]    Normal   Please notify patient and review reasons to call incl pelvic pain, bloating

## 2021-05-17 ENCOUNTER — TELEPHONE (OUTPATIENT)
Dept: FAMILY MEDICINE CLINIC | Facility: CLINIC | Age: 66
End: 2021-05-17

## 2021-11-11 ENCOUNTER — OFFICE VISIT (OUTPATIENT)
Dept: URBAN - METROPOLITAN AREA CLINIC 72 | Facility: CLINIC | Age: 66
End: 2021-11-11

## 2025-01-08 ENCOUNTER — OFFICE VISIT (OUTPATIENT)
Dept: URBAN - METROPOLITAN AREA CLINIC 72 | Facility: CLINIC | Age: 70
End: 2025-01-08
Payer: MEDICARE

## 2025-01-08 ENCOUNTER — LAB OUTSIDE AN ENCOUNTER (OUTPATIENT)
Dept: URBAN - METROPOLITAN AREA CLINIC 72 | Facility: CLINIC | Age: 70
End: 2025-01-08

## 2025-01-08 VITALS
DIASTOLIC BLOOD PRESSURE: 77 MMHG | HEART RATE: 62 BPM | TEMPERATURE: 97.7 F | SYSTOLIC BLOOD PRESSURE: 134 MMHG | BODY MASS INDEX: 24.3 KG/M2 | WEIGHT: 154.8 LBS | HEIGHT: 67 IN

## 2025-01-08 DIAGNOSIS — Z12.11 COLON CANCER SCREENING: ICD-10-CM

## 2025-01-08 DIAGNOSIS — K22.70 BARRETT'S ESOPHAGUS WITHOUT DYSPLASIA: ICD-10-CM

## 2025-01-08 DIAGNOSIS — K21.9 CHRONIC GERD: ICD-10-CM

## 2025-01-08 PROBLEM — 302914006: Status: ACTIVE | Noted: 2025-01-08

## 2025-01-08 PROBLEM — 235595009: Status: ACTIVE | Noted: 2025-01-08

## 2025-01-08 PROCEDURE — 99204 OFFICE O/P NEW MOD 45 MIN: CPT | Performed by: INTERNAL MEDICINE

## 2025-01-08 RX ORDER — ATORVASTATIN CALCIUM 10 MG/1
1 TABLET TABLET, FILM COATED ORAL ONCE A DAY
Status: ACTIVE | COMMUNITY

## 2025-01-08 RX ORDER — ZOLPIDEM TARTRATE 5 MG/1
1 TABLET AT BEDTIME AS NEEDED TABLET, FILM COATED ORAL ONCE A DAY
Status: ACTIVE | COMMUNITY

## 2025-01-08 RX ORDER — PANTOPRAZOLE SODIUM 40 MG/1
1 TABLET 1/2 TO 1 HOUR BEFORE MORNING MEAL TABLET, DELAYED RELEASE ORAL ONCE A DAY
Status: ACTIVE | COMMUNITY

## 2025-01-08 NOTE — EXAM-PHYSICAL EXAM
General--no acute distress, resting comfortably Eyes--anicteric, no pallor HENT--normocephalic, atraumatic head Neck--no lymphadenopathy, symmetric Chest--non labored breathing, equal rise Abdomen--soft, non tender, non distended, no organomegaly Ext: ISMAEL, no obvious sores or rashes

## 2025-01-08 NOTE — HPI-TODAY'S VISIT:
Mrs. Eubanks is a pleasant 69-year-old who presents as a new patient for consultation for reflux, she was referred by Dr. Angelina Tierney, a copy of this consultation will be forwarded to the referring physician. Per report patient has a history of reflux and Golden's esophagus, has been on pantoprazole 40 mg daily.  She had positive issues of reflux, had an EGD in 2010 felt there may be Golden's, repeat EGD in 2015 no evidence of Golden's, 2020 a local gastroenterologist and EGD and there was focal intestinal metaplasia, she has been on PPI intermittently.  In 2022 she was placed on pantoprazole 40 mg daily, 2024 had an accident with a shoulder injury and postoperatively has been suffering from significant reflux at that time.  She has been trying various supplements and is still on the pantoprazole.  She is now experiencing some dysphagia.

## 2025-01-09 ENCOUNTER — OFFICE VISIT (OUTPATIENT)
Dept: URBAN - METROPOLITAN AREA MEDICAL CENTER 40 | Facility: MEDICAL CENTER | Age: 70
End: 2025-01-09
Payer: MEDICARE

## 2025-01-09 DIAGNOSIS — K29.60 ADENOPAPILLOMATOSIS GASTRICA: ICD-10-CM

## 2025-01-09 DIAGNOSIS — K22.89 OTHER SPECIFIED DISEASE OF ESOPHAGUS: ICD-10-CM

## 2025-01-09 DIAGNOSIS — Z87.19 PERSONAL HISTORY OF OTHER DISEASES OF THE DIGESTIVE SYSTEM: ICD-10-CM

## 2025-01-09 PROCEDURE — 43239 EGD BIOPSY SINGLE/MULTIPLE: CPT | Performed by: INTERNAL MEDICINE

## 2025-01-09 RX ORDER — ZOLPIDEM TARTRATE 5 MG/1
1 TABLET AT BEDTIME AS NEEDED TABLET, FILM COATED ORAL ONCE A DAY
Status: ACTIVE | COMMUNITY

## 2025-01-09 RX ORDER — PANTOPRAZOLE SODIUM 40 MG/1
1 TABLET 1/2 TO 1 HOUR BEFORE MORNING MEAL TABLET, DELAYED RELEASE ORAL ONCE A DAY
Status: ACTIVE | COMMUNITY

## 2025-01-09 RX ORDER — ATORVASTATIN CALCIUM 10 MG/1
1 TABLET TABLET, FILM COATED ORAL ONCE A DAY
Status: ACTIVE | COMMUNITY

## 2025-01-10 ENCOUNTER — WEB ENCOUNTER (OUTPATIENT)
Dept: URBAN - METROPOLITAN AREA CLINIC 72 | Facility: CLINIC | Age: 70
End: 2025-01-10

## 2025-01-13 ENCOUNTER — WEB ENCOUNTER (OUTPATIENT)
Dept: URBAN - METROPOLITAN AREA CLINIC 72 | Facility: CLINIC | Age: 70
End: 2025-01-13

## 2025-01-14 ENCOUNTER — WEB ENCOUNTER (OUTPATIENT)
Dept: URBAN - METROPOLITAN AREA CLINIC 72 | Facility: CLINIC | Age: 70
End: 2025-01-14

## 2025-01-14 RX ORDER — FAMOTIDINE 40 MG/1
1 TABLET AT BEDTIME TABLET, FILM COATED ORAL ONCE A DAY
Qty: 30 TABLET | Refills: 0 | OUTPATIENT
Start: 2025-01-15

## 2025-01-15 ENCOUNTER — WEB ENCOUNTER (OUTPATIENT)
Dept: URBAN - METROPOLITAN AREA CLINIC 72 | Facility: CLINIC | Age: 70
End: 2025-01-15

## 2025-01-16 ENCOUNTER — TELEPHONE ENCOUNTER (OUTPATIENT)
Dept: URBAN - METROPOLITAN AREA CLINIC 107 | Facility: CLINIC | Age: 70
End: 2025-01-16

## 2025-01-16 ENCOUNTER — WEB ENCOUNTER (OUTPATIENT)
Dept: URBAN - METROPOLITAN AREA CLINIC 72 | Facility: CLINIC | Age: 70
End: 2025-01-16

## 2025-01-20 PROBLEM — 22304002: Status: ACTIVE | Noted: 2025-01-20

## 2025-01-21 ENCOUNTER — DASHBOARD ENCOUNTERS (OUTPATIENT)
Age: 70
End: 2025-01-21

## 2025-01-21 ENCOUNTER — WEB ENCOUNTER (OUTPATIENT)
Dept: URBAN - METROPOLITAN AREA CLINIC 72 | Facility: CLINIC | Age: 70
End: 2025-01-21

## 2025-01-21 ENCOUNTER — OFFICE VISIT (OUTPATIENT)
Dept: URBAN - METROPOLITAN AREA CLINIC 72 | Facility: CLINIC | Age: 70
End: 2025-01-21
Payer: MEDICARE

## 2025-01-21 VITALS
BODY MASS INDEX: 24.8 KG/M2 | HEART RATE: 78 BPM | WEIGHT: 158 LBS | DIASTOLIC BLOOD PRESSURE: 72 MMHG | TEMPERATURE: 97.3 F | SYSTOLIC BLOOD PRESSURE: 120 MMHG | HEIGHT: 67 IN

## 2025-01-21 DIAGNOSIS — K21.9 CHRONIC GERD: ICD-10-CM

## 2025-01-21 DIAGNOSIS — K59.09 CHRONIC CONSTIPATION: ICD-10-CM

## 2025-01-21 DIAGNOSIS — K29.30 SUPERFICIAL GASTRITIS WITHOUT HEMORRHAGE, UNSPECIFIED CHRONICITY: ICD-10-CM

## 2025-01-21 PROBLEM — 236069009: Status: ACTIVE | Noted: 2025-01-21

## 2025-01-21 PROCEDURE — 99214 OFFICE O/P EST MOD 30 MIN: CPT

## 2025-01-21 RX ORDER — PANTOPRAZOLE SODIUM 40 MG/1
1 TABLET, 30-60 MINUTES BEFORE BREAKFAST AND DINNER TABLET, DELAYED RELEASE ORAL TWICE DAILY
Qty: 60 | Refills: 0 | OUTPATIENT
Start: 2025-01-21

## 2025-01-21 RX ORDER — ATORVASTATIN CALCIUM 10 MG/1
1 TABLET TABLET, FILM COATED ORAL ONCE A DAY
Status: ACTIVE | COMMUNITY

## 2025-01-21 RX ORDER — FAMOTIDINE 40 MG/1
1 TABLET AT BEDTIME TABLET, FILM COATED ORAL ONCE A DAY
Qty: 30 TABLET | Refills: 0 | Status: ACTIVE | COMMUNITY
Start: 2025-01-15

## 2025-01-21 RX ORDER — FAMOTIDINE 40 MG/1
1 TABLET AT BEDTIME TABLET, FILM COATED ORAL ONCE A DAY
Qty: 90 TABLET | Refills: 0 | OUTPATIENT
Start: 2025-01-21

## 2025-01-21 RX ORDER — ZOLPIDEM TARTRATE 5 MG/1
1 TABLET AT BEDTIME AS NEEDED TABLET, FILM COATED ORAL ONCE A DAY
Status: ACTIVE | COMMUNITY

## 2025-01-21 RX ORDER — PANTOPRAZOLE SODIUM 40 MG/1
1 TABLET 1/2 TO 1 HOUR BEFORE MORNING MEAL TABLET, DELAYED RELEASE ORAL ONCE A DAY
Status: ACTIVE | COMMUNITY

## 2025-01-21 NOTE — HPI-OTHER HISTORIES
Procedures:  1/9/2025-EGD: examination of the esophagus revealed a normal esophagus.  Squamocolumnar junction appeared irregular and was located 39 cm from incisors.  Examination of the stomach revealed gastritis in the prepyloric region.  The affected area was edematous, erosive and erythematous.  Gastritis is mildly severe.  Multiple biopsies were collected.  Normal duodenum.  Path: Squama glandular mucosa with chronic inflammation compatible with esophageal mucosa.  Negative for intestinal metaplasia.  Mildly reactive squamous mucosa with basal cell hyperplasia, negative for acute inflammation and eosinophils on gastric biopsy.  Gastric glandular type mucosa with acute and chronic inflammation.  Negative for intestinal metaplasia.  Immunostain for H. pylori negative.

## 2025-01-21 NOTE — HPI-TODAY'S VISIT:
Patient is a 69-year-old female last seen in the office on 1/8/2025 by Dr. Marlee Infante for chronic GERD and Golden's esophagus.  Patient is on pantoprazole 40 mg daily and was experiencing some dysphagia so an EGD was performed on 1/9/2025 showing some increased acute and chronic inflammation in the esophagus and chronic inflammation in the stomach, but H. pylori negative.  I started patient on famotidine 40 mg before bed as she had complaints post EGD of stomach discomfort.  Patient is seen today - she tripped and fell in July 2024, tore her rotator cuff, took pain meds, and that is when her epigastric pain increased tremedously. She was taking ALEVE x3 daily. She has been on pantoprazole off and on since 5 years. She never took it daily. She has stopped her caffeine.   Bowels have started changing - rock - like stools. Abdominal pain - can be sharp pain.

## 2025-01-24 ENCOUNTER — WEB ENCOUNTER (OUTPATIENT)
Dept: URBAN - METROPOLITAN AREA CLINIC 72 | Facility: CLINIC | Age: 70
End: 2025-01-24

## 2025-01-25 ENCOUNTER — WEB ENCOUNTER (OUTPATIENT)
Dept: URBAN - METROPOLITAN AREA CLINIC 72 | Facility: CLINIC | Age: 70
End: 2025-01-25

## 2025-01-27 ENCOUNTER — TELEPHONE ENCOUNTER (OUTPATIENT)
Dept: URBAN - METROPOLITAN AREA CLINIC 5 | Facility: CLINIC | Age: 70
End: 2025-01-27

## 2025-01-28 ENCOUNTER — WEB ENCOUNTER (OUTPATIENT)
Dept: URBAN - METROPOLITAN AREA CLINIC 72 | Facility: CLINIC | Age: 70
End: 2025-01-28

## 2025-01-31 ENCOUNTER — WEB ENCOUNTER (OUTPATIENT)
Dept: URBAN - METROPOLITAN AREA CLINIC 72 | Facility: CLINIC | Age: 70
End: 2025-01-31

## 2025-02-03 ENCOUNTER — WEB ENCOUNTER (OUTPATIENT)
Dept: URBAN - METROPOLITAN AREA CLINIC 72 | Facility: CLINIC | Age: 70
End: 2025-02-03

## 2025-02-04 ENCOUNTER — WEB ENCOUNTER (OUTPATIENT)
Dept: URBAN - METROPOLITAN AREA CLINIC 72 | Facility: CLINIC | Age: 70
End: 2025-02-04

## 2025-02-07 ENCOUNTER — WEB ENCOUNTER (OUTPATIENT)
Dept: URBAN - METROPOLITAN AREA CLINIC 72 | Facility: CLINIC | Age: 70
End: 2025-02-07

## 2025-02-10 ENCOUNTER — WEB ENCOUNTER (OUTPATIENT)
Dept: URBAN - METROPOLITAN AREA CLINIC 72 | Facility: CLINIC | Age: 70
End: 2025-02-10

## 2025-02-11 ENCOUNTER — WEB ENCOUNTER (OUTPATIENT)
Dept: URBAN - METROPOLITAN AREA CLINIC 72 | Facility: CLINIC | Age: 70
End: 2025-02-11

## 2025-02-13 ENCOUNTER — OFFICE VISIT (OUTPATIENT)
Dept: URBAN - METROPOLITAN AREA CLINIC 72 | Facility: CLINIC | Age: 70
End: 2025-02-13

## 2025-02-25 ENCOUNTER — OFFICE VISIT (OUTPATIENT)
Dept: URBAN - METROPOLITAN AREA CLINIC 72 | Facility: CLINIC | Age: 70
End: 2025-02-25

## 2025-02-25 ENCOUNTER — WEB ENCOUNTER (OUTPATIENT)
Dept: URBAN - METROPOLITAN AREA CLINIC 72 | Facility: CLINIC | Age: 70
End: 2025-02-25

## 2025-02-25 NOTE — HPI-OTHER HISTORIES
Procedures:  1/9/2025-EGD: examination of the esophagus revealed a normal esophagus.  Squamocolumnar junction appeared irregular and was located 39 cm from incisors.  Examination of the stomach revealed gastritis in the prepyloric region.  The affected area was edematous, erosive and erythematous.  Gastritis is mildly severe.  Multiple biopsies were collected.  Normal duodenum.  Path: Squama glandular mucosa with chronic inflammation compatible with esophageal mucosa.  Negative for intestinal metaplasia.  Mildly reactive squamous mucosa with basal cell hyperplasia, negative for acute inflammation and eosinophils on gastric biopsy.  Gastric glandular type mucosa with acute and chronic inflammation.  Negative for intestinal metaplasia.  Immunostain for H. pylori negative.  Labs: 2/12/2025-stool studies ([facility]): Bacterial, parasitic and viral panel all negative.  Per these particular studies, patient may have some overgrowth of Mycobacterium specifically bacillus SPP.,  Enterococcus faecium, Streptococcus SBP.,  Klebsiella SBP and pneumoniae.

## 2025-02-25 NOTE — HPI-TODAY'S VISIT:
Patient is a 69-year-old female who has chronic GERD, superficial gastritis and chronic constipation.  Patient is on pantoprazole 40 mg twice daily and famotidine 40 mg nightly.  She takes MiraLAX and fiber daily.  Patient had an EGD performed on January 9, 2025, and ever since she has had acute abdominal pain.  Patient brought in stool studies which were forwarded to Dr. Infante for interpretation.

## 2025-02-26 ENCOUNTER — WEB ENCOUNTER (OUTPATIENT)
Dept: URBAN - METROPOLITAN AREA CLINIC 72 | Facility: CLINIC | Age: 70
End: 2025-02-26